# Patient Record
Sex: FEMALE | Employment: OTHER | URBAN - METROPOLITAN AREA
[De-identification: names, ages, dates, MRNs, and addresses within clinical notes are randomized per-mention and may not be internally consistent; named-entity substitution may affect disease eponyms.]

---

## 2019-01-30 LAB
CRP SERPL-MCNC: 1.3 MG/DL (ref 0–4.9)
EXT 24 HR UR METANEPHRINE: ABNORMAL
EXT 24 HR UR NORMETANEPHRINE: ABNORMAL
EXT 24 HR UR NORMETANEPHRINE: ABNORMAL
EXT 25 HYDROXY VIT D2: ABNORMAL
EXT 25 HYDROXY VIT D3: ABNORMAL
EXT 5 HIAA 24 HR URINE: ABNORMAL
EXT 5 HIAA BLOOD: ABNORMAL
EXT ACTH: ABNORMAL
EXT AFP: ABNORMAL
EXT ALBUMIN: 4 G/DL (ref 3.5–5.5)
EXT ALKALINE PHOSPHATASE: 116 IU/L (ref 39–117)
EXT ALT: 21 IU/L (ref 0–32)
EXT AMYLASE: ABNORMAL
EXT ANTI ISLET CELL AB: ABNORMAL
EXT ANTI PARIETAL CELL AB: ABNORMAL
EXT ANTI THYROID AB: ABNORMAL
EXT AST: 19 IU/L (ref 0–40)
EXT BILIRUBIN DIRECT: ABNORMAL
EXT BILIRUBIN TOTAL: 0.2 MG/DL (ref 0–1.2)
EXT BK VIRUS DNA QN PCR: ABNORMAL
EXT BUN: 12 MG/DL (ref 6–24)
EXT C PEPTIDE: ABNORMAL
EXT CA 125: ABNORMAL
EXT CA 19-9: ABNORMAL
EXT CA 27-29: ABNORMAL
EXT CALCITONIN: ABNORMAL
EXT CALCIUM: 8.6 MG/DL (ref 8.7–10.2)
EXT CEA: ABNORMAL
EXT CHLORIDE: 107 MMOL/L (ref 96–106)
EXT CHOLESTEROL: ABNORMAL
EXT CHROMOGRANIN A: ABNORMAL
EXT CO2: 21 MMOL/L (ref 20–29)
EXT CREATININE UA: ABNORMAL
EXT CREATININE: 0.77 MG/DL (ref 0.57–1)
EXT CYCLOSPORONE LEVEL: ABNORMAL
EXT DOPAMINE: ABNORMAL
EXT EBV DNA BY PCR: ABNORMAL
EXT EPINEPHRINE: ABNORMAL
EXT FOLATE: ABNORMAL
EXT FREE T3: ABNORMAL
EXT FREE T4: ABNORMAL
EXT FSH: ABNORMAL
EXT GASTRIN RELEASING PEPTIDE: ABNORMAL
EXT GASTRIN RELEASING PEPTIDE: ABNORMAL
EXT GASTRIN: 336 PG/ML (ref 0–115)
EXT GGT: ABNORMAL
EXT GHRELIN: ABNORMAL
EXT GLUCAGON: ABNORMAL
EXT GLUCOSE: 87 MG/DL (ref 65–99)
EXT GROWTH HORMONE: ABNORMAL
EXT HCV RNA QUANT PCR: ABNORMAL
EXT HDL: ABNORMAL
EXT HEMATOCRIT: 38.7 % (ref 34–46.6)
EXT HEMOGLOBIN A1C: ABNORMAL
EXT HEMOGLOBIN: 12.4 G/DL (ref 11.1–15.9)
EXT HISTAMINE 24 HR URINE: ABNORMAL
EXT HISTAMINE: ABNORMAL
EXT IGF-1: ABNORMAL
EXT IMMUNKNOW (NON-STIMULATED): ABNORMAL
EXT IMMUNKNOW (STIMULATED): ABNORMAL
EXT INR: ABNORMAL
EXT INSULIN: ABNORMAL
EXT LANREOTIDE LEVEL: ABNORMAL
EXT LDH, TOTAL: ABNORMAL
EXT LDL CHOLESTEROL: ABNORMAL
EXT LIPASE: ABNORMAL
EXT MAGNESIUM: ABNORMAL
EXT METANEPHRINE FREE PLASMA: ABNORMAL
EXT MOTILIN: ABNORMAL
EXT NEUROKININ A CAMB: ABNORMAL
EXT NEUROKININ A ISI: ABNORMAL
EXT NEUROTENSIN: ABNORMAL
EXT NOREPINEPHRINE: ABNORMAL
EXT NORMETANEPHRINE: ABNORMAL
EXT NSE: ABNORMAL
EXT OCTREOTIDE LEVEL: ABNORMAL
EXT PANCREASTATIN CAMB: ABNORMAL
EXT PANCREASTATIN ISI: ABNORMAL
EXT PANCREATIC POLYPEPTIDE: ABNORMAL
EXT PHOSPHORUS: ABNORMAL
EXT PLATELETS: 252 X10E3/UL (ref 150–379)
EXT POTASSIUM: 3.6 MMOL/L (ref 3.5–5.2)
EXT PROGRAF LEVEL: ABNORMAL
EXT PROLACTIN: ABNORMAL
EXT PROTEIN TOTAL: 6.4 G/DL (ref 6–8.5)
EXT PROTEIN UA: ABNORMAL
EXT PT: ABNORMAL
EXT PTH, INTACT: ABNORMAL
EXT PTT: ABNORMAL
EXT RAPAMUNE LEVEL: ABNORMAL
EXT SEROTONIN: 263 NG/ML (ref 0–420)
EXT SODIUM: 143 MMOL/L (ref 134–144)
EXT SOMATOSTATIN: ABNORMAL
EXT SUBSTANCE P: ABNORMAL
EXT TRIGLYCERIDES: ABNORMAL
EXT TRYPTASE: ABNORMAL
EXT TSH: ABNORMAL
EXT URIC ACID: ABNORMAL
EXT URINE AMYLASE U/HR: ABNORMAL
EXT URINE AMYLASE U/L: ABNORMAL
EXT VASOACTIVE INTESTINAL POLYPEPTIDE: ABNORMAL
EXT VITAMIN B12: ABNORMAL
EXT VMA 24 HR URINE: ABNORMAL
EXT WBC: 7.6 X10E3/UL (ref 3.4–10.8)
NEURON SPECIFIC ENOLASE: ABNORMAL
SEDIMENTATION RATE (WESTERGREN): 7 MM/HR (ref 0–32)

## 2019-12-17 LAB

## 2020-01-22 ENCOUNTER — TELEPHONE (OUTPATIENT)
Dept: NEUROLOGY | Facility: HOSPITAL | Age: 45
End: 2020-01-22

## 2020-01-22 DIAGNOSIS — R93.3 ABNORMAL CT SCAN, SMALL BOWEL: ICD-10-CM

## 2020-01-22 DIAGNOSIS — E16.4 ELEVATED GASTRIN LEVEL: Primary | ICD-10-CM

## 2020-01-22 RX ORDER — TOPIRAMATE 200 MG/1
TABLET ORAL 2 TIMES DAILY
COMMUNITY

## 2020-01-22 RX ORDER — AMITRIPTYLINE HYDROCHLORIDE 10 MG/1
10 TABLET, FILM COATED ORAL NIGHTLY PRN
COMMUNITY
End: 2020-02-12 | Stop reason: SDUPTHER

## 2020-01-22 RX ORDER — CHOLESTYRAMINE 4 G/9G
POWDER, FOR SUSPENSION ORAL
Status: ON HOLD | COMMUNITY
End: 2020-05-25

## 2020-01-22 RX ORDER — LACOSAMIDE 200 MG/1
TABLET ORAL EVERY 12 HOURS
COMMUNITY

## 2020-01-22 RX ORDER — HYDROCODONE BITARTRATE AND ACETAMINOPHEN 7.5; 325 MG/1; MG/1
1 TABLET ORAL EVERY 6 HOURS PRN
COMMUNITY
End: 2020-02-12 | Stop reason: SDUPTHER

## 2020-01-22 RX ORDER — DICYCLOMINE HYDROCHLORIDE 10 MG/1
10 CAPSULE ORAL
COMMUNITY
End: 2020-02-12 | Stop reason: SDUPTHER

## 2020-01-22 RX ORDER — PANTOPRAZOLE SODIUM 40 MG/1
40 TABLET, DELAYED RELEASE ORAL DAILY
COMMUNITY
End: 2020-02-12 | Stop reason: SDUPTHER

## 2020-01-22 RX ORDER — PROCHLORPERAZINE MALEATE 5 MG
5 TABLET ORAL EVERY 4 HOURS
Status: ON HOLD | COMMUNITY
End: 2020-06-05 | Stop reason: HOSPADM

## 2020-01-22 RX ORDER — PHENYTOIN SODIUM 100 MG/1
CAPSULE, EXTENDED RELEASE ORAL 3 TIMES DAILY
COMMUNITY
End: 2020-02-12 | Stop reason: SDUPTHER

## 2020-01-22 NOTE — TELEPHONE ENCOUNTER
New NET referral from Dr. Hancock.  Pt with elevated gastrin and abnormal oscan.Patient presented with recurrent abdominal pain with nausea and vomiting for more than a year.  Patient had numerous ER visits.  She has intermittent diarrhea and a 15# weight loss. She has been on questran and bentyl.

## 2020-01-22 NOTE — TELEPHONE ENCOUNTER
----- Message from Desean Guevara sent at 1/22/2020  1:08 PM CST -----  Pt is being referred to NeuroEndocrinology, I have scanned the referral/records into media mgr within Epic. Please review and contact pt for scheduling,thanks

## 2020-01-22 NOTE — TELEPHONE ENCOUNTER
Spoke with patient and scheduled her a consult with Dr. Barnett as well as a CT scan and markers. Patient stated she will bring her Oscan disc with her. Patient stated she has really felt fine but the doctor stated she needs to have this followed. Patient verbalized understanding.

## 2020-01-22 NOTE — TELEPHONE ENCOUNTER
----- Message from Vanessa Soriano RN sent at 1/22/2020  3:26 PM CST -----  pls call pt and sched with [facility] or [facility] for elevated gastrin and abd oscan.  Make sure he has a current ct (not in records).  If not, pls sched.  He will also need some labs.  Send pt lab order or have him drawn here.    Thanks    ----- Message -----  From: Desean Guevara  Sent: 1/22/2020   1:08 PM CST  To: [facility]    Pt is being referred to NeuroEndocrinology, I have scanned the referral/records into media mgr within Epic. Please review and contact pt for scheduling,thanks

## 2020-01-30 NOTE — PROGRESS NOTES
[facility]:  [place] [facility]  A collaboration between [facility] and [facility]      PATIENT: Kristan Goncalves  MRN: [id number]  DATE: 2/3/2020    Subjective:      Chief Complaint: Consult for elevated gastrin and abnormal octreoscan.Referred by Dr. Zafar Hancock.  Having lot of reflux, upper abdominal pain constant associated with nausea, vomiting and diarrhea  She has been having severe upper abdominal pain for the last 1 year, bile reflux with bile in the back of the throat in the early morning.    She was evaluated by her physician and now she is on Protonix and Carafate.  Since that time her abdominal pain has improved.  She reports about 15 lb weight loss because of all these problems    Vitals: Blood pressure 118/74, pulse 93, weight 104 kg (229 lb 4.5 oz). --    ECOG Score: 0 - Asymptomatic    Diagnosis: No diagnosis found.     Interval History: .Patient presented with recurrent abdominal pain with nausea and vomiting for more than a year.  Patient had numerous ER visits.  She has intermittent diarrhea and a 15# weight loss. She has been on questran and bentyl. No pathology yet.    Oncologic History:   Oncologic History    Oncologic Treatment    Pathology      Past Medical History:  Past Medical History:   Diagnosis Date    Elevated gastrin level     Seizures     Sleep apnea        Past Surgical History:  Past Surgical History:   Procedure Laterality Date    CHOLECYSTOTOMY      HYSTERECTOMY         Family History:  No family history on file.    Allergies:  Patient has no known allergies.    Medications:   Current Outpatient Medications   Medication Sig    amitriptyline (ELAVIL) 10 MG tablet Take 10 mg by mouth nightly as needed for Insomnia.    cholestyramine, with sugar, 4 gram Powd Take by mouth.    dicyclomine (BENTYL) 10 MG capsule Take 10 mg by mouth 4 (four) times daily before meals and nightly.     HYDROcodone-acetaminophen (NORCO) 7.5-325 mg per tablet Take 1 tablet by mouth every 6 (six) hours as needed for Pain.    lacosamide (VIMPAT) 200 mg Tab tablet Take by mouth every 12 (twelve) hours.    omeprazole (PRILOSEC) 40 MG capsule TAKE 1 CAPSULE TWICE DAILY FOR SIX WEEKS THEN TAKE 1 CAPSULE DAILY    pantoprazole (PROTONIX) 40 MG tablet Take 40 mg by mouth once daily.    phenytoin (DILANTIN) 100 MG ER capsule Take by mouth 3 (three) times daily.    prochlorperazine (COMPAZINE) 5 MG tablet Take 5 mg by mouth every 4 (four) hours.    sucralfate (CARAFATE) 1 gram tablet Take 1 g by mouth.    topiramate (TOPAMAX) 200 MG Tab Take by mouth 2 (two) times daily.     No current facility-administered medications for this visit.         Review of Systems   Constitutional: Positive for activity change, appetite change, fatigue and unexpected weight change. Negative for chills, diaphoresis and fever.   HENT: Negative for congestion, dental problem, drooling, ear discharge, hearing loss, mouth sores, nosebleeds, postnasal drip, sinus pressure, sneezing, sore throat, tinnitus and trouble swallowing.    Eyes: Negative for photophobia, discharge, redness, itching and visual disturbance.   Respiratory: Negative for apnea, cough, choking, chest tightness, shortness of breath, wheezing and stridor.    Cardiovascular: Negative for chest pain, palpitations and leg swelling.   Gastrointestinal: Positive for abdominal distention, abdominal pain, diarrhea, nausea and vomiting. Negative for anal bleeding and constipation.   Endocrine: Negative.    Genitourinary: Negative.    Musculoskeletal: Negative for arthralgias, back pain, gait problem and neck stiffness.   Skin: Negative for color change, pallor and rash.   Allergic/Immunologic: Negative.    Neurological: Positive for seizures. Negative for dizziness, tremors, speech difficulty, weakness, light-headedness, numbness and headaches.   Hematological: Negative for adenopathy.  Does not bruise/bleed easily.   Psychiatric/Behavioral: Negative for agitation, behavioral problems, confusion and decreased concentration.      Objective:      Physical Exam   Constitutional: She appears well-developed and well-nourished.   HENT:   Head: Normocephalic and atraumatic.   Right Ear: External ear normal.   Left Ear: External ear normal.   Mouth/Throat: Oropharynx is clear and moist.   Eyes: Pupils are equal, round, and reactive to light. Conjunctivae are normal.   Neck: Normal range of motion.   Cardiovascular: Normal rate and regular rhythm.   Pulmonary/Chest: Effort normal and breath sounds normal.   Abdominal: Soft. Bowel sounds are normal. She exhibits no distension and no mass. There is no tenderness. There is no rebound and no guarding. No hernia.   Musculoskeletal: Normal range of motion.   Neurological: She is alert.   Skin: Skin is warm and dry.   Psychiatric: She has a normal mood and affect. Her behavior is normal.      Assessment:       No diagnosis found.    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 She may have a type 1 gastric carcinoids.    She has been recently placed on Protonix and Carafate after which she feels she pain is slightly better.  The octreotide scan was reviewed if it has a normal activity at the head of the pancreas.    Will obtain gallium scan to evaluate for and delineate the findings better.  I discussed with him about the need to continue medical management with Protonix Carafate and if she is not getting better she may require an antrectomy and gastrojejunostomy.    I then briefly talked about the surgery surgical risks and the this the long-term effects after the surgery such as bleeding infection DVT PE weight loss after the  surgery.    Plan:     gallium scan to be done to evaluate the knee extra gastric activity  Continue medical management with Protonix and Carafate if not getting better may require partial gastrectomy.  Will plan to get perform it minimally invasive way.    I will see her after the gallium scan.            MAEGAN Barnett MD, FACS   Associate Professor of Surgery, [facility]   Neuroendocrine Surgery, Hepatic/Pancreatic & General Surgery   [address], [address]   [place] [address]   ph. [phone]; [phone]   fax. [phone]

## 2020-02-03 ENCOUNTER — OFFICE VISIT (OUTPATIENT)
Dept: NEUROLOGY | Facility: HOSPITAL | Age: 45
End: 2020-02-03
Attending: SURGERY
Payer: MEDICARE

## 2020-02-03 ENCOUNTER — HOSPITAL ENCOUNTER (OUTPATIENT)
Dept: RADIOLOGY | Facility: HOSPITAL | Age: 45
Discharge: HOME | End: 2020-02-03
Attending: SURGERY
Payer: MEDICARE

## 2020-02-03 VITALS — HEART RATE: 93 BPM | SYSTOLIC BLOOD PRESSURE: 118 MMHG | WEIGHT: 229.25 LBS | DIASTOLIC BLOOD PRESSURE: 74 MMHG

## 2020-02-03 DIAGNOSIS — E16.4 ELEVATED GASTRIN LEVEL: ICD-10-CM

## 2020-02-03 DIAGNOSIS — C7A.098 MALIGNANT CARCINOID TUMORS OF OTHER SITES: ICD-10-CM

## 2020-02-03 DIAGNOSIS — K29.00 OTHER ACUTE GASTRITIS WITHOUT HEMORRHAGE: ICD-10-CM

## 2020-02-03 DIAGNOSIS — E16.4 HYPERGASTRINEMIA: Primary | ICD-10-CM

## 2020-02-03 DIAGNOSIS — R93.3 ABNORMAL CT SCAN, SMALL BOWEL: ICD-10-CM

## 2020-02-03 PROCEDURE — 74178 CT ABD&PLV WO CNTR FLWD CNTR: CPT | Mod: 26,,, | Performed by: RADIOLOGY

## 2020-02-03 PROCEDURE — 74178 CT ABDOMEN PELVIS W WO CONTRAST: ICD-10-PCS | Mod: 26,,, | Performed by: RADIOLOGY

## 2020-02-03 PROCEDURE — 99213 OFFICE O/P EST LOW 20 MIN: CPT | Mod: 25 | Performed by: SURGERY

## 2020-02-03 PROCEDURE — 74178 CT ABD&PLV WO CNTR FLWD CNTR: CPT | Mod: TC

## 2020-02-03 PROCEDURE — 25500020 PHARM REV CODE 255: Performed by: SURGERY

## 2020-02-03 RX ORDER — SUCRALFATE 1 G/1
1 TABLET ORAL
COMMUNITY
Start: 2019-02-03 | End: 2020-02-03

## 2020-02-03 RX ORDER — OMEPRAZOLE 40 MG/1
CAPSULE, DELAYED RELEASE ORAL
COMMUNITY
Start: 2019-12-18 | End: 2020-02-12 | Stop reason: ALTCHOICE

## 2020-02-03 RX ADMIN — IOHEXOL 1000 ML: 12 SOLUTION ORAL at 08:02

## 2020-02-03 RX ADMIN — IOHEXOL 100 ML: 350 INJECTION, SOLUTION INTRAVENOUS at 10:02

## 2020-02-12 ENCOUNTER — HOSPITAL ENCOUNTER (EMERGENCY)
Facility: HOSPITAL | Age: 45
Discharge: HOME | End: 2020-02-12
Attending: EMERGENCY MEDICINE
Payer: MEDICARE

## 2020-02-12 ENCOUNTER — TELEPHONE (OUTPATIENT)
Dept: NEUROLOGY | Facility: HOSPITAL | Age: 45
End: 2020-02-12

## 2020-02-12 VITALS
SYSTOLIC BLOOD PRESSURE: 124 MMHG | OXYGEN SATURATION: 100 % | TEMPERATURE: 98 F | RESPIRATION RATE: 20 BRPM | HEART RATE: 94 BPM | DIASTOLIC BLOOD PRESSURE: 75 MMHG | WEIGHT: 220 LBS | HEIGHT: 65 IN | BODY MASS INDEX: 36.65 KG/M2

## 2020-02-12 DIAGNOSIS — R10.13 EPIGASTRIC ABDOMINAL PAIN: ICD-10-CM

## 2020-02-12 DIAGNOSIS — E86.0 DEHYDRATION: Primary | ICD-10-CM

## 2020-02-12 DIAGNOSIS — R11.2 NON-INTRACTABLE VOMITING WITH NAUSEA, UNSPECIFIED VOMITING TYPE: ICD-10-CM

## 2020-02-12 LAB
ALBUMIN SERPL BCP-MCNC: 5 G/DL (ref 3.5–5.2)
ALP SERPL-CCNC: 125 U/L (ref 55–135)
ALT SERPL W/O P-5'-P-CCNC: 18 U/L (ref 10–44)
ANION GAP SERPL CALC-SCNC: 15 MMOL/L (ref 8–16)
AST SERPL-CCNC: 19 U/L (ref 10–40)
BASOPHILS # BLD AUTO: 0.04 K/UL (ref 0–0.2)
BASOPHILS NFR BLD: 0.3 % (ref 0–1.9)
BILIRUB SERPL-MCNC: 0.5 MG/DL (ref 0.1–1)
BILIRUB UR QL STRIP: NEGATIVE
BUN SERPL-MCNC: 20 MG/DL (ref 6–20)
CALCIUM SERPL-MCNC: 10.4 MG/DL (ref 8.7–10.5)
CHLORIDE SERPL-SCNC: 105 MMOL/L (ref 95–110)
CLARITY UR: ABNORMAL
CO2 SERPL-SCNC: 19 MMOL/L (ref 23–29)
COLOR UR: ABNORMAL
CREAT SERPL-MCNC: 1.1 MG/DL (ref 0.5–1.4)
DIFFERENTIAL METHOD: ABNORMAL
EOSINOPHIL # BLD AUTO: 0 K/UL (ref 0–0.5)
EOSINOPHIL NFR BLD: 0.2 % (ref 0–8)
ERYTHROCYTE [DISTWIDTH] IN BLOOD BY AUTOMATED COUNT: 13.1 % (ref 11.5–14.5)
EST. GFR  (AFRICAN AMERICAN): >60 ML/MIN/1.73 M^2
EST. GFR  (NON AFRICAN AMERICAN): >60 ML/MIN/1.73 M^2
GLUCOSE SERPL-MCNC: 125 MG/DL (ref 70–110)
GLUCOSE UR QL STRIP: NEGATIVE
HCT VFR BLD AUTO: 50.1 % (ref 37–48.5)
HGB BLD-MCNC: 16 G/DL (ref 12–16)
HGB UR QL STRIP: ABNORMAL
IMM GRANULOCYTES # BLD AUTO: 0.06 K/UL (ref 0–0.04)
IMM GRANULOCYTES NFR BLD AUTO: 0.4 % (ref 0–0.5)
KETONES UR QL STRIP: NEGATIVE
LACTATE SERPL-SCNC: 1.4 MMOL/L (ref 0.5–2.2)
LEUKOCYTE ESTERASE UR QL STRIP: NEGATIVE
LIPASE SERPL-CCNC: 105 U/L (ref 4–60)
LYMPHOCYTES # BLD AUTO: 1.8 K/UL (ref 1–4.8)
LYMPHOCYTES NFR BLD: 11.7 % (ref 18–48)
MCH RBC QN AUTO: 26.9 PG (ref 27–31)
MCHC RBC AUTO-ENTMCNC: 31.9 G/DL (ref 32–36)
MCV RBC AUTO: 84 FL (ref 82–98)
MONOCYTES # BLD AUTO: 0.6 K/UL (ref 0.3–1)
MONOCYTES NFR BLD: 3.8 % (ref 4–15)
NEUTROPHILS # BLD AUTO: 12.8 K/UL (ref 1.8–7.7)
NEUTROPHILS NFR BLD: 83.6 % (ref 38–73)
NITRITE UR QL STRIP: NEGATIVE
NRBC BLD-RTO: 0 /100 WBC
PH UR STRIP: 6 [PH] (ref 5–8)
PLATELET # BLD AUTO: 338 K/UL (ref 150–350)
PMV BLD AUTO: 10.7 FL (ref 9.2–12.9)
POTASSIUM SERPL-SCNC: 3.4 MMOL/L (ref 3.5–5.1)
PROT SERPL-MCNC: 9.4 G/DL (ref 6–8.4)
PROT UR QL STRIP: ABNORMAL
RBC # BLD AUTO: 5.94 M/UL (ref 4–5.4)
SODIUM SERPL-SCNC: 139 MMOL/L (ref 136–145)
SP GR UR STRIP: >=1.03 (ref 1–1.03)
URN SPEC COLLECT METH UR: ABNORMAL
UROBILINOGEN UR STRIP-ACNC: NEGATIVE EU/DL
WBC # BLD AUTO: 15.33 K/UL (ref 3.9–12.7)

## 2020-02-12 PROCEDURE — 96375 TX/PRO/DX INJ NEW DRUG ADDON: CPT

## 2020-02-12 PROCEDURE — C9113 INJ PANTOPRAZOLE SODIUM, VIA: HCPCS | Performed by: PHYSICIAN ASSISTANT

## 2020-02-12 PROCEDURE — 93010 ELECTROCARDIOGRAM REPORT: CPT | Mod: ,,, | Performed by: STUDENT IN AN ORGANIZED HEALTH CARE EDUCATION/TRAINING PROGRAM

## 2020-02-12 PROCEDURE — 83605 ASSAY OF LACTIC ACID: CPT

## 2020-02-12 PROCEDURE — 93010 EKG 12-LEAD: ICD-10-PCS | Mod: ,,, | Performed by: STUDENT IN AN ORGANIZED HEALTH CARE EDUCATION/TRAINING PROGRAM

## 2020-02-12 PROCEDURE — 99284 EMERGENCY DEPT VISIT MOD MDM: CPT | Mod: 25

## 2020-02-12 PROCEDURE — 93005 ELECTROCARDIOGRAM TRACING: CPT

## 2020-02-12 PROCEDURE — 63600175 PHARM REV CODE 636 W HCPCS: Performed by: PHYSICIAN ASSISTANT

## 2020-02-12 PROCEDURE — 96374 THER/PROPH/DIAG INJ IV PUSH: CPT

## 2020-02-12 PROCEDURE — 85025 COMPLETE CBC W/AUTO DIFF WBC: CPT

## 2020-02-12 PROCEDURE — 83690 ASSAY OF LIPASE: CPT

## 2020-02-12 PROCEDURE — 80053 COMPREHEN METABOLIC PANEL: CPT

## 2020-02-12 PROCEDURE — 96361 HYDRATE IV INFUSION ADD-ON: CPT

## 2020-02-12 PROCEDURE — 81003 URINALYSIS AUTO W/O SCOPE: CPT

## 2020-02-12 RX ORDER — METOCLOPRAMIDE HYDROCHLORIDE 5 MG/ML
5 INJECTION INTRAMUSCULAR; INTRAVENOUS
Status: COMPLETED | OUTPATIENT
Start: 2020-02-12 | End: 2020-02-12

## 2020-02-12 RX ORDER — AMITRIPTYLINE HYDROCHLORIDE 10 MG/1
10 TABLET, FILM COATED ORAL NIGHTLY PRN
Qty: 10 TABLET | Refills: 0 | Status: SHIPPED | OUTPATIENT
Start: 2020-02-12

## 2020-02-12 RX ORDER — DICYCLOMINE HYDROCHLORIDE 10 MG/1
10 CAPSULE ORAL
Qty: 20 CAPSULE | Refills: 0 | Status: ON HOLD | OUTPATIENT
Start: 2020-02-12 | End: 2020-05-25

## 2020-02-12 RX ORDER — PANTOPRAZOLE SODIUM 40 MG/10ML
40 INJECTION, POWDER, LYOPHILIZED, FOR SOLUTION INTRAVENOUS
Status: COMPLETED | OUTPATIENT
Start: 2020-02-12 | End: 2020-02-12

## 2020-02-12 RX ORDER — PANTOPRAZOLE SODIUM 40 MG/1
40 TABLET, DELAYED RELEASE ORAL DAILY
Qty: 30 TABLET | Refills: 0 | Status: SHIPPED | OUTPATIENT
Start: 2020-02-12

## 2020-02-12 RX ORDER — ONDANSETRON 4 MG/1
4 TABLET, ORALLY DISINTEGRATING ORAL EVERY 8 HOURS PRN
Qty: 30 TABLET | Refills: 0 | Status: SHIPPED | OUTPATIENT
Start: 2020-02-12

## 2020-02-12 RX ORDER — MORPHINE SULFATE 4 MG/ML
4 INJECTION, SOLUTION INTRAMUSCULAR; INTRAVENOUS
Status: COMPLETED | OUTPATIENT
Start: 2020-02-12 | End: 2020-02-12

## 2020-02-12 RX ORDER — DIPHENHYDRAMINE HYDROCHLORIDE 50 MG/ML
25 INJECTION INTRAMUSCULAR; INTRAVENOUS
Status: COMPLETED | OUTPATIENT
Start: 2020-02-12 | End: 2020-02-12

## 2020-02-12 RX ORDER — PHENYTOIN SODIUM 100 MG/1
100 CAPSULE, EXTENDED RELEASE ORAL 3 TIMES DAILY
Qty: 30 CAPSULE | Refills: 0 | Status: SHIPPED | OUTPATIENT
Start: 2020-02-12

## 2020-02-12 RX ORDER — HYDROCODONE BITARTRATE AND ACETAMINOPHEN 7.5; 325 MG/1; MG/1
1 TABLET ORAL EVERY 6 HOURS PRN
Qty: 5 TABLET | Refills: 0 | Status: ON HOLD | OUTPATIENT
Start: 2020-02-12 | End: 2020-06-05 | Stop reason: HOSPADM

## 2020-02-12 RX ORDER — PROMETHAZINE HYDROCHLORIDE 25 MG/1
25 SUPPOSITORY RECTAL EVERY 6 HOURS PRN
Qty: 10 SUPPOSITORY | Refills: 0 | Status: ON HOLD | OUTPATIENT
Start: 2020-02-12 | End: 2020-06-05 | Stop reason: HOSPADM

## 2020-02-12 RX ADMIN — PANTOPRAZOLE SODIUM 40 MG: 40 INJECTION, POWDER, LYOPHILIZED, FOR SOLUTION INTRAVENOUS at 01:02

## 2020-02-12 RX ADMIN — SODIUM CHLORIDE 1000 ML: 0.9 INJECTION, SOLUTION INTRAVENOUS at 12:02

## 2020-02-12 RX ADMIN — DIPHENHYDRAMINE HYDROCHLORIDE 25 MG: 50 INJECTION, SOLUTION INTRAMUSCULAR; INTRAVENOUS at 12:02

## 2020-02-12 RX ADMIN — SODIUM CHLORIDE 1000 ML: 0.9 INJECTION, SOLUTION INTRAVENOUS at 03:02

## 2020-02-12 RX ADMIN — MORPHINE SULFATE 4 MG: 4 INJECTION INTRAVENOUS at 02:02

## 2020-02-12 RX ADMIN — METOCLOPRAMIDE 5 MG: 5 INJECTION, SOLUTION INTRAMUSCULAR; INTRAVENOUS at 12:02

## 2020-02-12 NOTE — ED NOTES
To room 11 per wheelchair from lobby bathroom. Pt. Is actively vomiting mostly clear liquid emesis.

## 2020-02-12 NOTE — ED PROVIDER NOTES
Encounter Date: 2/12/2020       History     Chief Complaint   Patient presents with    Emesis     pt was released from hospital yesterday, pt of dr Turcios, started with n/v/d this am at 0600       Afebrile 44-year-old female with PMH seizures, sleep apnea, elevated gastrin level,  Chronic upper abdominal pain, nausea, vomiting, diarrhea with possible neuroendocrine abnormality followed by neuroendocrine specialist presents to the ED with continued nausea and vomiting and upper abdominal pain.  Patient and family state that this is consistent of her chronic process.  They do state she is unable to keep anything down despite trying Zofran at home prior to drive here as she is from [place].  She is due to have a special gastric scan tomorrow to better evaluate.  Patient does report some generalized weakness and fatigue secondary to vomiting and diarrhea.  Patient states that she has noted some blood tinge hematemesis.  Once again patient states that she is followed by a neuroendocrine specialist and GI specialist with recent scope in the past.    The history is provided by the patient.     Review of patient's allergies indicates:  No Known Allergies  Past Medical History:   Diagnosis Date    Elevated gastrin level     Seizures     Sleep apnea      Past Surgical History:   Procedure Laterality Date    CHOLECYSTOTOMY      HYSTERECTOMY       No family history on file.  Social History     Tobacco Use    Smoking status: Former Smoker   Substance Use Topics    Alcohol use: Not on file    Drug use: Not on file     Review of Systems   Constitutional: Positive for fatigue. Negative for chills and fever.   HENT: Negative for congestion and sore throat.    Respiratory: Negative for cough and shortness of breath.    Cardiovascular: Negative for chest pain.   Gastrointestinal: Positive for abdominal pain, diarrhea, nausea and vomiting.   Genitourinary: Negative for dysuria, flank pain, vaginal bleeding and vaginal  discharge.   Musculoskeletal: Negative for arthralgias, back pain and myalgias.   Skin: Negative for rash.   Allergic/Immunologic: Negative for immunocompromised state.   Neurological: Positive for weakness and light-headedness. Negative for dizziness and headaches.   Hematological: Does not bruise/bleed easily.       Physical Exam     Initial Vitals [02/12/20 1156]   BP Pulse Resp Temp SpO2   134/84 (!) 114 19 98 °F (36.7 °C) 99 %      MAP       --         Vitals:    02/12/20 1750   BP: 124/75   Pulse: 94   Resp: 20   Temp: 97.9 °F (36.6 °C)       Physical Exam    Nursing note and vitals reviewed.  Constitutional: Vital signs are normal. She appears well-developed and well-nourished. She is cooperative.  Non-toxic appearance. She does not appear ill. She appears distressed.   HENT:   Head: Normocephalic and atraumatic.   Mouth/Throat: Mucous membranes are not pale and dry. No posterior oropharyngeal edema or posterior oropharyngeal erythema.   Eyes: Conjunctivae and lids are normal.   Neck: Neck supple. No neck rigidity.   Cardiovascular: Normal rate and regular rhythm.   Pulmonary/Chest: Breath sounds normal. No respiratory distress. She has no wheezes. She has no rhonchi.   Abdominal: Soft. Normal appearance and bowel sounds are normal. There is generalized tenderness. There is no rigidity, no rebound, no guarding and no CVA tenderness.   Musculoskeletal: Normal range of motion.   Neurological: She is alert and oriented to person, place, and time. GCS eye subscore is 4. GCS verbal subscore is 5. GCS motor subscore is 6.   Skin: Skin is warm, dry and intact. No rash noted.   Psychiatric: She has a normal mood and affect. Her speech is normal and behavior is normal. Thought content normal.         ED Course   Procedures  Labs Reviewed   CBC W/ AUTO DIFFERENTIAL - Abnormal; Notable for the following components:       Result Value    WBC 15.33 (*)     RBC 5.94 (*)     Hematocrit 50.1 (*)     Mean Corpuscular  Hemoglobin 26.9 (*)     Mean Corpuscular Hemoglobin Conc 31.9 (*)     Gran # (ANC) 12.8 (*)     Immature Grans (Abs) 0.06 (*)     Gran% 83.6 (*)     Lymph% 11.7 (*)     Mono% 3.8 (*)     All other components within normal limits   COMPREHENSIVE METABOLIC PANEL - Abnormal; Notable for the following components:    Potassium 3.4 (*)     CO2 19 (*)     Glucose 125 (*)     Total Protein 9.4 (*)     All other components within normal limits   LIPASE - Abnormal; Notable for the following components:    Lipase 105 (*)     All other components within normal limits   LACTIC ACID, PLASMA   LACTIC ACID, PLASMA   URINALYSIS, REFLEX TO URINE CULTURE        ECG Results          EKG 12-lead (Final result)  Result time 02/12/20 15:16:20    Final result by Interface, Lab In [facility] (02/12/20 15:16:20)                 Narrative:    Test Reason : R10.13,    Vent. Rate : 134 BPM     Atrial Rate : 134 BPM     P-R Int : 140 ms          QRS Dur : 072 ms      QT Int : 302 ms       P-R-T Axes : 075 -63 064 degrees     QTc Int : 450 ms    Sinus tachycardia  Right atrial enlargement  Left axis deviation  Abnormal ECG  No previous ECGs available  Confirmed by Remigio Calderon MD ([id number]) on 2/12/2020 3:15:53 PM    Referred By: SHAWNA   SELF           Confirmed By:Remigio Calderon MD                            Imaging Results    None          Medical Decision Making:   Initial Assessment:    44-year-old female with history of chronic abdominal pain recurrent nausea and vomiting and diarrhea presents the ED for exacerbation of the symptoms.  Patient recently began care with neuroendocrine specialist as concern for this being the etiology of her symptoms.  She is due to have gastric scan tomorrow.  She appears in some distress secondary symptoms and has noted dry mucous membranes.  Heart with tachycardia; normal rhythm.  Lungs CTA.  Abdomen with generalized tenderness however on re-evaluation this has resolved.  No guarding or rigidity or rebound no  CVA tenderness.  Fair range of motion of all extremities with strength equal bilaterally.   Skin free of rash pallor or diaphoresis.  Differential Diagnosis:   Differential Diagnosis includes, but is not limited to:  AAA, aortic dissection, mesenteric ischemia, perforated viscous, MI/ACS, SBO/volvulus, incarcerated/strangulated hernia, intussusception, ileus, appendicitis, cholecystitis, cholangitis, diverticulitis, esophagitis, hepatitis, nephrolithiasis, pancreatitis, gastroenteritis, colitis, IBD/IBS, biliary colic, GERD, PUD, constipation, UTI/pyelonephritis,  disorder.    Clinical Tests:   Lab Tests: Ordered and Reviewed  Radiological Study: Ordered and Reviewed  Medical Tests: Ordered and Reviewed  ED Management:    EKG reveals sinus tachycardia this did improve after oral hydration.  Labs consistent with dehydration as she appears hemoconcentrated with a mildly elevated leukocytosis and mild hypokalemia.  Lactic within normal limits.  Patient continues to be afebrile with low suspicion of acute surgical abdomen at this time.  Lipase is noted to be elevated however this appears chronically some of this patient.    The suspicion of this acute surgical abdomen at this time given the chronicity of patient's symptoms and no significant abnormalities on her workup today with her having had multiple CT scans and scopes in the past. Did discuss findings with Dr. Ericka adam and patient was offered observation for further IV hydration however would like to be discharged so she can have her gastric scan tomorrow.  She will be sent home with antiemetics and was instructed on proper return should symptoms worsen or new symptoms begin.   Patient did have successful p.o. Challenge and moderate improvement symptoms in the ED after antiemetic and pain medication.  His patient intended to go back to [place] today she does not have her medications and she will be sent home with a short supply of these including pain  medication. Strict instructions to follow up with primary care physician or reference provided for further assessment and evaluation. Given instructions to return for any acute symptoms and verbalized understanding of this medical plan.    Other:   I have discussed this case with another health care provider.       <> Summary of the Discussion: 4:24 PM. Spoke with Dr. Barnett (General Surgery). He recommends a follow-up appointment tomorrow for scan. He may be discharged on clear liquids and antiemetics.              Attending Attestation:     Physician Attestation Statement for NP/PA:   I discussed this assessment and plan of this patient with the NP/PA, but I did not personally examine the patient. The face to face encounter was performed by the NP/PA.                                Clinical Impression:       ICD-10-CM ICD-9-CM   1. Dehydration E86.0 276.51   2. Epigastric abdominal pain R10.13 789.06   3. Non-intractable vomiting with nausea, unspecified vomiting type R11.2 787.01                             ANDRES Patton  02/13/20 1319       Arnaldo Carr MD  02/14/20 [id number]

## 2020-02-12 NOTE — ED NOTES
Assisted pt. To bathroom per wheelchair and clean catch urine sample collected and sent to lab. Pt. Still has moderate weakness, n/v has improved. Pt. Given ice chips and awaiting update on disposition. Family at bedside.

## 2020-02-12 NOTE — ED NOTES
ANDRES Nieves at bedside for reassessment and to discuss test results and plan of care with pt. And family.

## 2020-02-12 NOTE — TELEPHONE ENCOUNTER
Spoke with pt and she was not feeling good at all. She is coming to the ER here. Told her that the ER doctors would call Dr. Lepe. Pt verbalized understanding

## 2020-02-12 NOTE — ED NOTES
Pt is alert and oriented. Pt appears to be in distress, dry heaving and c/o abdominal pain, unable to sit up straight.  Pt daughter states pt has hx of gastric ulcers and is due to have a scan tomorrow. She was seen in the ER in [facility] on Monday this week and was sent home with zofran and carafate. Pt states she had some relief to her symptoms but they returned today. Pt c/o abdominal pain 10/10 to the upper left and right quadrants with and without palpation. Pt denies chest pain, sob, diarrhea, chills, fever.

## 2020-02-13 ENCOUNTER — HOSPITAL ENCOUNTER (OUTPATIENT)
Dept: RADIOLOGY | Facility: HOSPITAL | Age: 45
Discharge: HOME | End: 2020-02-13
Attending: SURGERY
Payer: MEDICARE

## 2020-02-13 DIAGNOSIS — C7A.098 MALIGNANT CARCINOID TUMORS OF OTHER SITES: ICD-10-CM

## 2020-02-13 DIAGNOSIS — E16.4 HYPERGASTRINEMIA: ICD-10-CM

## 2020-02-13 DIAGNOSIS — K29.00 OTHER ACUTE GASTRITIS WITHOUT HEMORRHAGE: ICD-10-CM

## 2020-02-13 PROCEDURE — A9587 GALLIUM GA-68: HCPCS

## 2020-02-13 PROCEDURE — 78815 NM PET 68GA DOTATATE WHOLE BODY: ICD-10-PCS | Mod: 26,PS,, | Performed by: RADIOLOGY

## 2020-02-13 PROCEDURE — 78815 PET IMAGE W/CT SKULL-THIGH: CPT | Mod: 26,PS,, | Performed by: RADIOLOGY

## 2020-02-13 PROCEDURE — 78815 PET IMAGE W/CT SKULL-THIGH: CPT | Mod: TC

## 2020-02-14 NOTE — PROGRESS NOTES
"N[place]AN[place]:  [facility]  A collaboration between [facility] and [facility]      PATIENT: Kristan Goncalves  MRN: [id number]  DATE: 2/17/2020    Subjective:      Chief Complaint: No chief complaint on file.   She has been referred here for persistent nausea vomiting and was found to have a high gastrin level. She underwent EGD which shows diffuse inflammation of the whole stomach. She was recently in the ER for severe pain nausea vomiting.  She was scheduled for gallium scan gallium scan around the time she was rehydrated.  She feels better today however she has frequent intermittent episodes of this nausea vomiting.    Vitals: Blood pressure 122/77, pulse 73, height 5' 5" (1.651 m), weight 102.5 kg (225 lb 15.5 oz). -  ECOG Score: 1 - Symptomatic but completely ambulatory    Diagnosis: No diagnosis found.     Interval History:     Oncologic History:   Oncologic History    Oncologic Treatment    Pathology      Past Medical History:  Past Medical History:   Diagnosis Date    Elevated gastrin level     Seizures     Sleep apnea        Past Surgical History:  Past Surgical History:   Procedure Laterality Date    CHOLECYSTOTOMY      HYSTERECTOMY         Family History:  History reviewed. No pertinent family history.    Allergies:  Patient has no known allergies.    Medications:   Current Outpatient Medications   Medication Sig    amitriptyline (ELAVIL) 10 MG tablet Take 1 tablet (10 mg total) by mouth nightly as needed for Insomnia.    HYDROcodone-acetaminophen (NORCO) 7.5-325 mg per tablet Take 1 tablet by mouth every 6 (six) hours as needed for Pain.    lacosamide (VIMPAT) 200 mg Tab tablet Take by mouth every 12 (twelve) hours.    ondansetron (ZOFRAN-ODT) 4 MG TbDL Take 1 tablet (4 mg total) by mouth every 8 (eight) hours as needed.    pantoprazole (PROTONIX) 40 MG tablet Take 1 tablet (40 mg total) by mouth once daily. "    phenytoin (DILANTIN) 100 MG ER capsule Take 1 capsule (100 mg total) by mouth 3 (three) times daily.    promethazine (PHENERGAN) 25 MG suppository Place 1 suppository (25 mg total) rectally every 6 (six) hours as needed for Nausea.    topiramate (TOPAMAX) 200 MG Tab Take by mouth 2 (two) times daily.    cholestyramine, with sugar, 4 gram Powd Take by mouth.    dicyclomine (BENTYL) 10 MG capsule Take 1 capsule (10 mg total) by mouth 4 (four) times daily before meals and nightly. (Patient not taking: Reported on 2/17/2020)    prochlorperazine (COMPAZINE) 5 MG tablet Take 5 mg by mouth every 4 (four) hours.     No current facility-administered medications for this visit.         Review of Systems   Constitutional: Positive for activity change, appetite change and fatigue. Negative for chills, diaphoresis, fever and unexpected weight change.   HENT: Negative for congestion, dental problem, ear discharge, mouth sores, nosebleeds, postnasal drip, rhinorrhea, sinus pressure, sinus pain, sneezing, sore throat, tinnitus and trouble swallowing.    Eyes: Negative for photophobia, pain, discharge, redness, itching and visual disturbance.   Respiratory: Negative for apnea, cough, choking, chest tightness, shortness of breath and stridor.    Cardiovascular: Negative for chest pain, palpitations and leg swelling.   Gastrointestinal: Positive for abdominal distention, nausea and vomiting. Negative for constipation, diarrhea and rectal pain.   Endocrine: Negative.    Genitourinary: Negative.    Musculoskeletal: Negative.    Skin: Negative.    Allergic/Immunologic: Negative.    Neurological: Negative for dizziness, tremors, seizures, syncope, facial asymmetry, speech difficulty, weakness, numbness and headaches.   Hematological: Negative for adenopathy. Does not bruise/bleed easily.   Psychiatric/Behavioral: Negative for agitation and dysphoric mood.      Objective:      Physical Exam   Constitutional: She is oriented to  person, place, and time. She appears well-developed and well-nourished.   HENT:   Head: Normocephalic and atraumatic.   Right Ear: External ear normal.   Left Ear: External ear normal.   Eyes: Pupils are equal, round, and reactive to light. Conjunctivae are normal.   Neck: Normal range of motion.   Cardiovascular: Normal rate and regular rhythm.   Pulmonary/Chest: Effort normal and breath sounds normal.   Abdominal: Soft. Bowel sounds are normal. She exhibits no distension and no mass. There is no tenderness. There is no rebound and no guarding. No hernia.   Musculoskeletal: Normal range of motion.   Neurological: She is alert and oriented to person, place, and time.   Skin: Skin is warm and dry.   Psychiatric: She has a normal mood and affect. Her behavior is normal.      Assessment:       No diagnosis found.    Laboratory Data:       Scans:   NM PET Ga68 Dotatate  Narrative: EXAMINATION:  NM PET 68GA DOTATATE WHOLE BODY    CLINICAL HISTORY:  Malignant carcinoid tumors of other sites    TECHNIQUE:  3.8 mCi of GA68 Dotatate was administered intravenously in the right hand. After an approximately 60 min distribution time, PET/CT images were acquired from the skull vertex to the mid thigh. Transmission images were acquired to correct for attenuation using a whole body low-dose CT scan without contrast with the arms positioned above the head.    COMPARISON:  CT abdomen and pelvis 02/03/2020    FINDINGS:  Quality of the study: Adequate.    There are 2 foci of tracer avidity at the level of the 3rd portion of the duodenum or possibly the uncinate process of the pancreas.  These foci potentially represent tumor deposits.  Correlation with endoscopy and EUS is recommended.  No definite other gallium avid foci elsewhere.    Enlarged left thyroid lobe with a large hypodense nodule seen inferiorly measuring up to 2.7 cm with mild gallium avidity.  Nonemergent evaluation with ultrasound is advised.    Physiologic uptake of the  tracer is seen within the pituitary, liver, spleen, kidneys, adrenal glands and bowel.    Incidental CT findings: Mild hepatomegaly.  Cholecystectomy.  Hysterectomy.  Gastric diverticulum noted.  Indeterminate bilateral cystic adnexal lesions, the largest in the right measuring up to 7.2 cm in anterior-posterior dimension.  Further evaluation with dedicated pelvic ultrasound is recommended as previously outlined.  Impression: Two foci of tracer avidity at the level of the 3rd portion of the duodenum or possibly the uncinate process.  These foci potentially could represent tumor deposits in this patient with abnormally elevated gastrin levels.  Correlation with endoscopy and EUS is recommended.    No other findings suggestive of somatostatin receptor rich tumor.    Enlarged left thyroid lobe with a large hypodense nodule noted inferiorly.  Nonemergent evaluation with ultrasound is advised.    Indeterminate bilateral cystic adnexal lesions, largest in the right similar in appearance to recent examination.  Dedicated evaluation with pelvic ultrasound is recommended.    This report was flagged in Epic as containing an incidental finding.    I, Musa Weller MD, attest that I reviewed and interpreted the images.    Electronically signed by resident: Swapnil Marte  Date:    02/13/2020  Time:    10:10    Electronically signed by: Musa Weller  Date:    02/13/2020  Time:    11:24       Impression:  44-year-old female was referred here for high gastrin level with significant episodes of nausea vomiting.  She is not diabetic cover she has symptoms suggestive of gastroparesis.  She had simply a significant acid reflux disease causing esophageal stricture.  Her gastrin level was found to be significantly elevated up to 1500.  She is on double dose of Protonix amd Prilosec.    She underwent gallium scan which shows uptake in the 3rd portion of duodenum close to the uncinate process.  He underwent EGD by her physician in  [place] which showed diffuse inflammation all over the stomach.    She was accompanied by her daughters.  She may need a antrectomy with the resection of these areas in the 3rd portion of the duodenum/uncinate process.  With prior to that she needs an endoscopic ultrasound to evaluate the pancreas and this lesions, gastric emptying scan and ultrasound of the neck.    She continues to smoke and I strongly recommend that she quit smoking.    Plan:       Symptomatic hypogastric any a with multiple episodes of admissions for nausea vomiting.  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  The neuroendocrine markers are pending.  She may have was a little seen type of syndrome.  Before that she needs to have gastric emptying scan and ultrasound of the thyroid.  She will also be scheduled for endoscopic ultrasound of the pancreas.  Will re-evaluate and discussed at the tumor board    The importance of nutrition physical activity and stopping smoking were all discussed.    In the CT scan done she has a adnexal mass on the right side she previously has had hysterectomy with resection of the left ovarian structures. I strongly recommended she taken appointment to see [place] gynecologist in [place] hometown which she intends to do.  Follow-up in about 3 weeks time after the completion of all this test          MAEGAN Barnett MD, FACS   Associate Professor of Surgery, [facility]   Neuroendocrine Surgery, Hepatic/Pancreatic & General Surgery   [address]   [place] [address]   ph. [phone]; [phone]   fax. [phone]

## 2020-02-17 ENCOUNTER — OFFICE VISIT (OUTPATIENT)
Dept: NEUROLOGY | Facility: HOSPITAL | Age: 45
End: 2020-02-17
Attending: SURGERY
Payer: MEDICARE

## 2020-02-17 VITALS
BODY MASS INDEX: 37.65 KG/M2 | DIASTOLIC BLOOD PRESSURE: 77 MMHG | HEART RATE: 73 BPM | HEIGHT: 65 IN | WEIGHT: 226 LBS | SYSTOLIC BLOOD PRESSURE: 122 MMHG

## 2020-02-17 DIAGNOSIS — C7A.092 MALIGNANT CARCINOID TUMOR OF STOMACH: Primary | ICD-10-CM

## 2020-02-17 DIAGNOSIS — D3A.092 CARCINOID TUMOR OF STOMACH: ICD-10-CM

## 2020-02-17 PROCEDURE — 99214 OFFICE O/P EST MOD 30 MIN: CPT | Performed by: SURGERY

## 2020-02-17 RX ORDER — ONDANSETRON 2 MG/ML
8 INJECTION INTRAMUSCULAR; INTRAVENOUS
Status: CANCELLED | OUTPATIENT
Start: 2020-02-17

## 2020-02-17 RX ORDER — ACETAMINOPHEN 500 MG
1000 TABLET ORAL
Status: CANCELLED | OUTPATIENT
Start: 2020-02-17 | End: 2020-02-17

## 2020-02-17 RX ORDER — PREGABALIN 75 MG/1
75 CAPSULE ORAL
Status: CANCELLED | OUTPATIENT
Start: 2020-02-17

## 2020-02-17 RX ORDER — FAMOTIDINE 10 MG/ML
20 INJECTION INTRAVENOUS
Status: CANCELLED | OUTPATIENT
Start: 2020-02-17

## 2020-02-17 RX ORDER — KETOROLAC TROMETHAMINE 15 MG/ML
15 INJECTION, SOLUTION INTRAMUSCULAR; INTRAVENOUS
Status: CANCELLED | OUTPATIENT
Start: 2020-02-17 | End: 2020-02-20

## 2020-02-17 RX ORDER — ENOXAPARIN SODIUM 100 MG/ML
30 INJECTION SUBCUTANEOUS
Status: CANCELLED | OUTPATIENT
Start: 2020-02-17

## 2020-02-17 NOTE — PATIENT INSTRUCTIONS
EUS [facility] will contact you to set up appt   US thyroid/Gastric Emptying - please call [phone]    Patient Instructions    Name:   rKistan Goncalves          Take a Hibiclens shower twice a day for 3 days prior to surgery, including the morning of surgery.   Gargle with Listerine twice a day for 2 days prior to surgery, including the morning of surgery.      ________3/16/20_________     Appointment with Dr. Hopson   Pre [place] for Hospital Admission - Go to 1st floor of the hospital-admitting desk. You will do paper work, get blood drawn,  get x-rays and see Anesthesia at this time.     _______3/19/20__________   CLEAR LIQUIDS all day   Start bowel prep  Ducolax Laxative tablets - 2 tablets at 12 noon  Magnesium Citrate - 1 bottle at 2 pm   Take antibiotics as prescribed   Do not eat or drink anything after midnight.                   _________3/20/20_________   Hospital Admission for surgery.  Report to 2nd floor, Same Day Surgery desk at ____7am____   Surgery is scheduled for __9am_______        [facility] [place] [address] [place]  [address]  [phone]      INFORMATION REGARDING YOUR PROCEDURE      We look forward to serving you and your family and appreciate that you have chosen [facility] for your healthcare needs. Before, during, and after your surgery, you will be cared for by skilled medical professionals. Our surgeons, anesthesiologists, nurses,  and other healthcare professionals will work with you and your family to ensure a safe, smooth and comfortable surgery and recovery.    In order to best meet your pre-admission needs, your surgeon or ordering physicians office will contact our Scheduling Office at [phone] and schedule a Pre-Procedure Appointment.  This should preferably be done 72 hours or greater before your scheduled procedure date.     During your pre-procedure visit your insurance will be verified for your procedure.  You will meet with a Registered Nurse and an Anesthesiologist or Nurse Anesthetist. If tests are required, they will be performed during your visit. Please allow about one and a half hours for this visit.      You will need to bring the following information or items with you to your Pre-Procedure Appointment:    1.  Picture Identification  2.  Insurance Card  3.  Current list of medications to include name and dosage  4.  List of allergies  5.  Orders and any other forms your doctor has given to you  6.  Copies of lab results performed at other facilities. This may include blood work, EKGs or chest xrays.   These results can be faxed to [phone].    The Pre-Op Center Registration Desk is located on the first floor of the \[facility]\"" ([address] [place] [address]) in the [facility].  The Pre-Operative Center is located on the second floor of the hospital. Someone will walk you from the registration area to the Pre-Operative Center.    Free parking is located in the front of the hospital and medical office building and is easily accessed from [address] and [address]. When you arrive, please check in at the main information desk of the hospital.    Please call Outpatient Surgery at [phone] after 12:00pm on the day before your procedure for your arrival time and updated procedure time.      Pre-Op Bathing Instructions    Before surgery, you can play an important role in your own health.    Because skin is not sterile, we need to be sure that your skin is as free of germs as possible. By following the instructions below, you can reduce the number of germs on your skin before surgery.    IMPORTANT: You will need to shower with a special soap called Hibiclens*, available at any pharmacy.  If you are allergic to Chlorhexidine (the antiseptic in Hibiclens), use an antibacterial soap such as Dial Soap for your preoperative shower.  You will shower with Hibiclens both the night before your surgery and  the morning of your surgery.  Do not use Hibiclens on the head, face or genitals to avoid injury to those areas.    STEP #1: THE NIGHT BEFORE YOUR SURGERY     1. Do not shave the area of your body where your surgery will be performed.  2. Shower and wash your hair and body as usual with your normal soap and shampoo.  3. Rinse your hair and body thoroughly after you shower to remove all soap residue.  4. With your hand, apply one packet of Hibiclens soap to the surgical site.   5. Wash the site gently for five (5) minutes. Do not scrub your skin too hard.   6. Do not wash with your regular soap after Hibiclens is used.  7. Rinse your body thoroughly.  8. Pat yourself dry with a clean, soft towel.  9. Do not use lotion, cream, or powder.  10. Wear clean clothes.    STEP #2: THE MORNING OF YOUR SURGERY     1. Repeat Step #1.    * Not to be used by people allergic to Chlorhexidine.

## 2020-02-18 ENCOUNTER — TELEPHONE (OUTPATIENT)
Dept: NEUROLOGY | Facility: HOSPITAL | Age: 45
End: 2020-02-18

## 2020-02-18 NOTE — TELEPHONE ENCOUNTER
Spoke with pt and we set up the Gastric emptying procedure 3/4/20 at 730 am. Pt verbalized understanding

## 2020-02-21 ENCOUNTER — HOSPITAL ENCOUNTER (OUTPATIENT)
Dept: RADIOLOGY | Facility: HOSPITAL | Age: 45
Discharge: HOME | End: 2020-02-21
Attending: SURGERY
Payer: MEDICARE

## 2020-02-21 DIAGNOSIS — D3A.092 CARCINOID TUMOR OF STOMACH: ICD-10-CM

## 2020-02-21 DIAGNOSIS — C7A.092 MALIGNANT CARCINOID TUMOR OF STOMACH: ICD-10-CM

## 2020-02-21 PROCEDURE — 76536 US EXAM OF HEAD AND NECK: CPT | Mod: 26,,, | Performed by: RADIOLOGY

## 2020-02-21 PROCEDURE — 76536 US EXAM OF HEAD AND NECK: CPT | Mod: TC

## 2020-02-21 PROCEDURE — 76536 US SOFT TISSUE HEAD NECK THYROID: ICD-10-PCS | Mod: 26,,, | Performed by: RADIOLOGY

## 2020-02-27 ENCOUNTER — TELEPHONE (OUTPATIENT)
Dept: GASTROENTEROLOGY | Facility: CLINIC | Age: 45
End: 2020-02-27

## 2020-02-27 DIAGNOSIS — R10.84 GENERALIZED ABDOMINAL PAIN: Primary | ICD-10-CM

## 2020-02-27 NOTE — TELEPHONE ENCOUNTER
----- Message from Vonda Beasley RN sent at 2/18/2020 11:38 AM CST -----  Hello    This pt needs to be set up for an EUS as per Dr. Emiliana Colmenares -Vonda

## 2020-02-27 NOTE — LETTER
[place] [facility]  [address], [address]  [place] [address]  Phone: [phone]                 [address]   [place] [address]          Your Upper EUS is scheduled for  03/17/2020 at 700am      at [facility] [place] which [facility] [address] [address]  You will check in at the Hospital Admit Desk located on the 1st floor of the hospital. Please contact  the office two days before procedure date to reschedule if needed  [phone]    Upper Endoscopic Ultrasound    Endoscopic ultrasound(EUS) is a procedure used to image the digestive tract, including pancreas, lesions in esophagus and stomach.  It is used to diagnose and stage cancers of the digestive tract.  If necessary, your doctor may need to take samples during the procedure.    A responsible adult (family member or friend) must come with you and transport you home.  You are not allowed to drive, take a taxi or bus or leave the Endoscopy Center alone.  If you do not have a responsible adult with you to take you home, your exam will be cancelled.     If you have questions about the cost of your procedure, you should contact your insurance company as soon as possible.  Please bring a picture ID and your insurance card.  You will sign  treatment authorization forms at check in.  It is necessary for you to sign these forms again even if you recently signed these at the time of your clinic visit.    Please follow instructions of  if you are taking anticoagulant/blood thinning medications such as Aggrenox, aspirin, Brilinta, Effient, Eliquis, Lovenox, Plavix, Pletal, Pradaxa, Ticilid, Xarelto or Coumadin.     Take blood pressure, heart, anti-rejection and or seizure medication at 600 am the morning of the procedure.    Skip your morning dose of insulin or other oral medications for diabetes the morning of the procedure unless instructed otherwise by your doctor.      Day Before The  Procedure    Eat a light evening meal and eat no solid food after 7 pm.  You may drink clear liquids including:    Water, Coffee or decaffeinated coffee (no milk or cream)  Tea, Herbal tea  Carbonated beverages (soft drinks), regular and sugar free  Gelatin  Apple Juice, grape juice, white cranberry juice  Gatorade, Power Aid, Crystal Light, Oseas Aid  Lemonade and Limeade  Bouillon, clear consomme'  Snowball, popsicles  DO NOT DRINK ANY LIQUIDS CONTAINING RED DYE  DO NOT DRINK ANY LIQUIDS NOT SPECIFICALLY LISTED  DO NOT DRINK ALCOHOL  NOTHING BY MOUTH AFTER MIDNIGHT    Day of Procedure    600 am take last dose of any medications you are allowed to take with a small amount of clear liquid

## 2020-03-04 ENCOUNTER — TELEPHONE (OUTPATIENT)
Dept: NEUROLOGY | Facility: HOSPITAL | Age: 45
End: 2020-03-04

## 2020-03-13 ENCOUNTER — TELEPHONE (OUTPATIENT)
Dept: ENDOSCOPY | Facility: HOSPITAL | Age: 45
End: 2020-03-13

## 2020-03-13 ENCOUNTER — TELEPHONE (OUTPATIENT)
Dept: SURGERY | Facility: HOSPITAL | Age: 45
End: 2020-03-13

## 2020-03-13 NOTE — TELEPHONE ENCOUNTER
Spoke with patient about arrival time @ 0700    NPO status reviewed: Patient may eat until 7:00pm.  After 7pm, pt may have CLEAR liquids ONLY until completely NPO at Midnight.    Medications: Do not take Insulin or oral diabetic medications the day of the procedure.    Take as prescribed: heart, seizure and blood pressure medication in the morning with a sip of water (less than an ounce).  Take any breathing medications and bring inhalers to hospital with you.     Leave all valuables and jewelry at home. Wear comfortable clothes to procedure to change into hospital gown.   You cannot drive for 24 hours after your procedure because you will receive sedation for your procedure to make you comfortable.    A ride must be provided at discharge.

## 2020-03-16 ENCOUNTER — HOSPITAL ENCOUNTER (OUTPATIENT)
Dept: PREADMISSION TESTING | Facility: HOSPITAL | Age: 45
Discharge: HOME | End: 2020-03-16
Attending: SURGERY
Payer: MEDICARE

## 2020-03-16 ENCOUNTER — TELEPHONE (OUTPATIENT)
Dept: NEUROLOGY | Facility: HOSPITAL | Age: 45
End: 2020-03-16

## 2020-03-16 ENCOUNTER — TELEPHONE (OUTPATIENT)
Dept: ENDOSCOPY | Facility: HOSPITAL | Age: 45
End: 2020-03-16

## 2020-03-16 NOTE — TELEPHONE ENCOUNTER
Spoke to the pt and explained to the exposure to the oncornavirus and that Dr. Lepe has cancelled all surgery for the next 2 weeks. She verbalized understanding

## 2020-03-20 ENCOUNTER — TELEPHONE (OUTPATIENT)
Dept: NEUROLOGY | Facility: HOSPITAL | Age: 45
End: 2020-03-20

## 2020-03-20 NOTE — TELEPHONE ENCOUNTER
Pt requesting status of planned surgery.  Pt informed due to covid 19, surgery has not been scheduled.  Pt to be notified with scheduled date.  Pt acknowledged understanding.

## 2020-03-20 NOTE — TELEPHONE ENCOUNTER
----- Message from Janett Francisco sent at 3/20/2020 10:22 AM CDT -----  Contact: self [phone]  Patient called in requesting to speak with you. Patient prefers to speak with a nurse. Please advise.

## 2020-05-11 ENCOUNTER — TELEPHONE (OUTPATIENT)
Dept: NEUROLOGY | Facility: HOSPITAL | Age: 45
End: 2020-05-11

## 2020-05-11 DIAGNOSIS — C7A.092 MALIGNANT CARCINOID TUMOR OF STOMACH: Primary | ICD-10-CM

## 2020-05-11 DIAGNOSIS — D3A.092 CARCINOID TUMOR OF STOMACH: ICD-10-CM

## 2020-05-11 RX ORDER — ACETAMINOPHEN 500 MG
1000 TABLET ORAL
Status: CANCELLED | OUTPATIENT
Start: 2020-05-11 | End: 2020-05-11

## 2020-05-11 RX ORDER — ENOXAPARIN SODIUM 100 MG/ML
30 INJECTION SUBCUTANEOUS
Status: CANCELLED | OUTPATIENT
Start: 2020-05-11

## 2020-05-11 RX ORDER — KETOROLAC TROMETHAMINE 15 MG/ML
15 INJECTION, SOLUTION INTRAMUSCULAR; INTRAVENOUS
Status: CANCELLED | OUTPATIENT
Start: 2020-05-11 | End: 2020-05-14

## 2020-05-11 RX ORDER — ONDANSETRON 2 MG/ML
8 INJECTION INTRAMUSCULAR; INTRAVENOUS
Status: CANCELLED | OUTPATIENT
Start: 2020-05-11

## 2020-05-11 RX ORDER — FAMOTIDINE 10 MG/ML
20 INJECTION INTRAVENOUS
Status: CANCELLED | OUTPATIENT
Start: 2020-05-11

## 2020-05-11 RX ORDER — PREGABALIN 75 MG/1
75 CAPSULE ORAL
Status: CANCELLED | OUTPATIENT
Start: 2020-05-11

## 2020-05-12 ENCOUNTER — ANESTHESIA EVENT (OUTPATIENT)
Dept: SURGERY | Facility: HOSPITAL | Age: 45
DRG: 327 | End: 2020-05-12
Payer: MEDICARE

## 2020-05-18 ENCOUNTER — TELEPHONE (OUTPATIENT)
Dept: NEUROLOGY | Facility: HOSPITAL | Age: 45
End: 2020-05-18

## 2020-05-18 NOTE — TELEPHONE ENCOUNTER
Clinic appt scheduled with pt on Monday, May 25, 2020 at 1040am.  Pt informed [facility] will contact to schedule EUS, the afternoon of May25, 2020.  Surgery to be scheduled Friday, May 29, 2020,  Pt acknowledged understanding.

## 2020-05-20 ENCOUNTER — TELEPHONE (OUTPATIENT)
Dept: GASTROENTEROLOGY | Facility: CLINIC | Age: 45
End: 2020-05-20

## 2020-05-20 NOTE — LETTER
[facility]  [address] [facility] [address]  [place] [address]  Phone: [phone]               [address] [address]   [place] [address]      Upper Endoscopic Ultrasound     [facility] [place] [facility] [address]  Clinic Office [phone]  Endoscopy Lab [phone]      Your Upper EUS is scheduled on  Dr. Menjivar on 5/25/2020 at 800am  at [facility] which is located at [address] [place] [address] [place] [address].  You will check in at the Hospital Admit Desk located on the 1st floor of the hospital. Please contact the office two days before procedure date to reschedule if needed [phone]    Upper Endoscopic Ultrasound    Endoscopic ultrasound(EUS) is a procedure used to image the digestive tract, including pancreas, lesions in esophagus and stomach.  It is used to diagnose and stage cancers of the digestive tract.  If necessary, your doctor may need to take samples during the procedure.     A responsible adult (family member or friend) must come with you and transport you home.  You are not allowed to drive, take a taxi or bus or leave the Endoscopy Center alone.  If you do not have a responsible adult with you to take you home, your exam will be cancelled.     If you have questions about the cost of your procedure, you should contact your insurance company as soon as possible.  Please bring a picture ID and your insurance card.  You will sign treatment authorization forms at check in.  It is necessary for you to sign these forms again even if you recently signed these at the time of your clinic visit.    Please follow instructions of  if you are taking anticoagulant/blood thinning medications such as Aggrenox, Brilinta, Effient, Eliquis, Lovenox, Plavix, Pletal, Pradaxa, Ticilid, Xarelto or Coumadin.     Please skip your morning dose of insulin or other oral medications for diabetes the morning of the procedure unless instructed  otherwise by your doctor. You should take your blood pressure, heart, anti-rejection and or seizure medication the morning of your procedure       The Day Before The Procedure:     Eat a light evening meal before 7 pm.   Eat no solid food after 7 pm.     From 7 pm until 12 midnight, you may drink clear liquids including:    Water, Coffee or decaffeinated coffee (no milk or cream)  Tea, Herbal tea  Carbonated beverages (soft drinks), regular and sugar free  Gelatin  Apple Juice, grape juice, white cranberry juice  Gatorade, Power Aid, Crystal Light, Oseas Aid  Lemonade and Limeade  Bouillon, clear consomme'  Snowball, popsicles    DO NOT DRINK ANY LIQUIDS CONTAINING RED DYE  DO NOT DRINK ANY LIQUIDS NOT SPECIFICALLY LISTED  DO NOT DRINK ALCOHOL     AFTER MIDNIGHT, YOU MAY HAVE NOTHING ELSE BY MOUTH    The Day of the Procedure     At 600 am, you may take the last dose of any medications you are allowed to take with a small sip of water (blood pressure, heart, anti-rejection and or seizure medication).  If you use inhalers bring them with you.   Please leave all valuables and jewelry at home.   You may call the Endoscopy department at [phone] with any questions regarding your procedure.

## 2020-05-20 NOTE — TELEPHONE ENCOUNTER
[facility] [address]  Clinic Office [phone]  Endoscopy Lab [phone]      Your Upper EUS is scheduled on  Dr. Menjivar on 5/25/2020 at 800am  at [facility] which is located at [address] [place] [id number].  You will check in at the Hospital Admit Desk located on the 1st floor of the hospital. Please contact the office two days before procedure date to reschedule if needed [phone]    Upper Endoscopic Ultrasound    Endoscopic ultrasound(EUS) is a procedure used to image the digestive tract, including pancreas, lesions in esophagus and stomach.  It is used to diagnose and stage cancers of the digestive tract.  If necessary, your doctor may need to take samples during the procedure.     A responsible adult (family member or friend) must come with you and transport you home.  You are not allowed to drive, take a taxi or bus or leave the Endoscopy Center alone.  If you do not have a responsible adult with you to take you home, your exam will be cancelled.     If you have questions about the cost of your procedure, you should contact your insurance company as soon as possible.  Please bring a picture ID and your insurance card.  You will sign treatment authorization forms at check in.  It is necessary for you to sign these forms again even if you recently signed these at the time of your clinic visit.    Please follow instructions of  if you are taking anticoagulant/blood thinning medications such as Aggrenox, Brilinta, Effient, Eliquis, Lovenox, Plavix, Pletal, Pradaxa, Ticilid, Xarelto or Coumadin.     Please skip your morning dose of insulin or other oral medications for diabetes the morning of the procedure unless instructed otherwise by your doctor. You should take your blood pressure, heart, anti-rejection and or seizure medication the morning of your procedure       The Day Before The Procedure:     Eat a light evening meal before 7  pm.   Eat no solid food after 7 pm.     From 7 pm until 12 midnight, you may drink clear liquids including:    Water, Coffee or decaffeinated coffee (no milk or cream)  Tea, Herbal tea  Carbonated beverages (soft drinks), regular and sugar free  Gelatin  Apple Juice, grape juice, white cranberry juice  Gatorade, Power Aid, Crystal Light, Oseas Aid  Lemonade and Limeade  Bouillon, clear consomme'  Snowball, popsicles    DO NOT DRINK ANY LIQUIDS CONTAINING RED DYE  DO NOT DRINK ANY LIQUIDS NOT SPECIFICALLY LISTED  DO NOT DRINK ALCOHOL     AFTER MIDNIGHT, YOU MAY HAVE NOTHING ELSE BY MOUTH    The Day of the Procedure     At 600 am, you may take the last dose of any medications you are allowed to take with a small sip of water (blood pressure, heart, anti-rejection and or seizure medication).  If you use inhalers bring them with you.   Please leave all valuables and jewelry at home.   You may call the Endoscopy department at [phone] with any questions regarding your procedure.

## 2020-05-20 NOTE — TELEPHONE ENCOUNTER
----- Message from Xuan Ortiz LPN sent at 5/18/2020  4:36 PM CDT -----  Please schedule pt with EUS on Monday, May 25, 2020.  Pt is scheduled to see Dr. Lepe on this date at 1040am, can eus be done after this appt.  Pt lives in [facility].  Surgery scheduled on Friday, May 29, 2020.  Thanks.

## 2020-05-21 ENCOUNTER — TELEPHONE (OUTPATIENT)
Dept: NEUROLOGY | Facility: HOSPITAL | Age: 45
End: 2020-05-21

## 2020-05-21 ENCOUNTER — TELEPHONE (OUTPATIENT)
Dept: ENDOSCOPY | Facility: HOSPITAL | Age: 45
End: 2020-05-21

## 2020-05-21 NOTE — TELEPHONE ENCOUNTER
Spoke with patient about arrival time @ *580.     NPO status reviewed: Patient may eat until 7:00pm.  After 7pm, pt may have CLEAR liquids ONLY until completely NPO at Midnight.    Medications: Do not take Insulin or oral diabetic medications the day of the procedure.    Take as prescribed: heart, seizure and blood pressure medication in the morning with a sip of water (less than an ounce).  Take any breathing medications and bring inhalers to hospital with you.     Leave all valuables and jewelry at home. Wear comfortable clothes to procedure to change into hospital gown.   You cannot drive for 24 hours after your procedure because you will receive sedation for your procedure to make you comfortable.    A ride must be provided at discharge.     Pt instructed on covid test to be done am of procedure.

## 2020-05-21 NOTE — TELEPHONE ENCOUNTER
Pt scheduled with eus on 5/25/20, clinic appt cancel.  Pt has preop appt scheduled  At 1130am.  Consents to be signed prior to surgery.  Pt acknowledged understanding.

## 2020-05-22 RX ORDER — SODIUM CHLORIDE 0.9 % (FLUSH) 0.9 %
10 SYRINGE (ML) INJECTION
Status: CANCELLED | OUTPATIENT
Start: 2020-05-22

## 2020-05-22 RX ORDER — SODIUM CHLORIDE 9 MG/ML
INJECTION, SOLUTION INTRAVENOUS CONTINUOUS
Status: CANCELLED | OUTPATIENT
Start: 2020-05-22

## 2020-05-25 ENCOUNTER — ANESTHESIA EVENT (OUTPATIENT)
Dept: ENDOSCOPY | Facility: HOSPITAL | Age: 45
End: 2020-05-25
Payer: MEDICARE

## 2020-05-25 ENCOUNTER — HOSPITAL ENCOUNTER (OUTPATIENT)
Facility: HOSPITAL | Age: 45
Discharge: HOME | End: 2020-05-25
Attending: INTERNAL MEDICINE | Admitting: INTERNAL MEDICINE
Payer: MEDICARE

## 2020-05-25 ENCOUNTER — HOSPITAL ENCOUNTER (OUTPATIENT)
Dept: RADIOLOGY | Facility: HOSPITAL | Age: 45
Discharge: HOME | End: 2020-05-25
Attending: SURGERY
Payer: MEDICARE

## 2020-05-25 ENCOUNTER — OFFICE VISIT (OUTPATIENT)
Dept: NEUROLOGY | Facility: HOSPITAL | Age: 45
End: 2020-05-25
Attending: SURGERY
Payer: MEDICARE

## 2020-05-25 ENCOUNTER — ANESTHESIA (OUTPATIENT)
Dept: ENDOSCOPY | Facility: HOSPITAL | Age: 45
End: 2020-05-25
Payer: MEDICARE

## 2020-05-25 VITALS
TEMPERATURE: 98 F | BODY MASS INDEX: 38.4 KG/M2 | RESPIRATION RATE: 18 BRPM | HEIGHT: 65 IN | DIASTOLIC BLOOD PRESSURE: 75 MMHG | SYSTOLIC BLOOD PRESSURE: 111 MMHG | WEIGHT: 230 LBS | HEART RATE: 66 BPM | SYSTOLIC BLOOD PRESSURE: 119 MMHG | WEIGHT: 230.5 LBS | HEART RATE: 64 BPM | TEMPERATURE: 98 F | OXYGEN SATURATION: 99 % | DIASTOLIC BLOOD PRESSURE: 67 MMHG | BODY MASS INDEX: 38.32 KG/M2 | HEIGHT: 65 IN

## 2020-05-25 DIAGNOSIS — K21.00 GASTROESOPHAGEAL REFLUX DISEASE WITH ESOPHAGITIS: ICD-10-CM

## 2020-05-25 DIAGNOSIS — R93.89 ABNORMAL FINDINGS ON IMAGING TEST: ICD-10-CM

## 2020-05-25 DIAGNOSIS — E16.4 ZOLLINGER-ELLISON SYNDROME: ICD-10-CM

## 2020-05-25 DIAGNOSIS — K22.4 ESOPHAGEAL MOTILITY DISORDER: ICD-10-CM

## 2020-05-25 DIAGNOSIS — Z01.818 PREOP TESTING: ICD-10-CM

## 2020-05-25 DIAGNOSIS — E16.4 ZOLLINGER-ELLISON SYNDROME: Primary | ICD-10-CM

## 2020-05-25 DIAGNOSIS — N83.8 MASS, OVARIAN: ICD-10-CM

## 2020-05-25 DIAGNOSIS — R13.11 DYSPHAGIA, ORAL PHASE: ICD-10-CM

## 2020-05-25 LAB — SARS-COV-2 RDRP RESP QL NAA+PROBE: NEGATIVE

## 2020-05-25 PROCEDURE — 43236 UPPR GI SCOPE W/SUBMUC INJ: CPT | Performed by: INTERNAL MEDICINE

## 2020-05-25 PROCEDURE — 63600175 PHARM REV CODE 636 W HCPCS: Performed by: NURSE ANESTHETIST, CERTIFIED REGISTERED

## 2020-05-25 PROCEDURE — 88342 CHG IMMUNOCYTOCHEMISTRY: ICD-10-PCS | Mod: 26,,, | Performed by: PATHOLOGY

## 2020-05-25 PROCEDURE — 88342 IMHCHEM/IMCYTCHM 1ST ANTB: CPT | Performed by: PATHOLOGY

## 2020-05-25 PROCEDURE — 71046 X-RAY EXAM CHEST 2 VIEWS: CPT | Mod: 26,,, | Performed by: RADIOLOGY

## 2020-05-25 PROCEDURE — 25000003 PHARM REV CODE 250: Performed by: INTERNAL MEDICINE

## 2020-05-25 PROCEDURE — 88305 TISSUE EXAM BY PATHOLOGIST: CPT | Mod: 26,,, | Performed by: PATHOLOGY

## 2020-05-25 PROCEDURE — 43259 PR ENDOSCOPIC ULTRASOUND EXAM: ICD-10-PCS | Mod: ,,, | Performed by: INTERNAL MEDICINE

## 2020-05-25 PROCEDURE — 43259 EGD US EXAM DUODENUM/JEJUNUM: CPT | Mod: ,,, | Performed by: INTERNAL MEDICINE

## 2020-05-25 PROCEDURE — 88305 TISSUE EXAM BY PATHOLOGIST: CPT | Performed by: PATHOLOGY

## 2020-05-25 PROCEDURE — 27201012 HC FORCEPS, HOT/COLD, DISP: Performed by: INTERNAL MEDICINE

## 2020-05-25 PROCEDURE — 37000008 HC ANESTHESIA 1ST 15 MINUTES: Performed by: INTERNAL MEDICINE

## 2020-05-25 PROCEDURE — 43242 EGD US FINE NEEDLE BX/ASPIR: CPT | Performed by: INTERNAL MEDICINE

## 2020-05-25 PROCEDURE — 71046 XR CHEST PA AND LATERAL PRE-OP: ICD-10-PCS | Mod: 26,,, | Performed by: RADIOLOGY

## 2020-05-25 PROCEDURE — 74220 X-RAY XM ESOPHAGUS 1CNTRST: CPT | Mod: TC

## 2020-05-25 PROCEDURE — 88341 PR IHC OR ICC EACH ADD'L SINGLE ANTIBODY  STAINPR: ICD-10-PCS | Mod: 26,,, | Performed by: PATHOLOGY

## 2020-05-25 PROCEDURE — 71046 X-RAY EXAM CHEST 2 VIEWS: CPT | Mod: TC,FY

## 2020-05-25 PROCEDURE — 43236 PR EGD, FLEX, W/SUBMUC INJECTION(S), ANY SUBSTANCE: ICD-10-PCS | Mod: 59,,, | Performed by: INTERNAL MEDICINE

## 2020-05-25 PROCEDURE — 88341 IMHCHEM/IMCYTCHM EA ADD ANTB: CPT | Performed by: PATHOLOGY

## 2020-05-25 PROCEDURE — 37000009 HC ANESTHESIA EA ADD 15 MINS: Performed by: INTERNAL MEDICINE

## 2020-05-25 PROCEDURE — 25000003 PHARM REV CODE 250: Performed by: NURSE ANESTHETIST, CERTIFIED REGISTERED

## 2020-05-25 PROCEDURE — 74220 X-RAY XM ESOPHAGUS 1CNTRST: CPT | Mod: 26,,, | Performed by: RADIOLOGY

## 2020-05-25 PROCEDURE — 88341 IMHCHEM/IMCYTCHM EA ADD ANTB: CPT | Mod: 26,,, | Performed by: PATHOLOGY

## 2020-05-25 PROCEDURE — 27201028 HC NEEDLE, SCLERO: Performed by: INTERNAL MEDICINE

## 2020-05-25 PROCEDURE — U0002 COVID-19 LAB TEST NON-CDC: HCPCS

## 2020-05-25 PROCEDURE — 43236 UPPR GI SCOPE W/SUBMUC INJ: CPT | Mod: 59,,, | Performed by: INTERNAL MEDICINE

## 2020-05-25 PROCEDURE — 43239 EGD BIOPSY SINGLE/MULTIPLE: CPT | Mod: ,,, | Performed by: INTERNAL MEDICINE

## 2020-05-25 PROCEDURE — 43239 PR EGD, FLEX, W/BIOPSY, SGL/MULTI: ICD-10-PCS | Mod: ,,, | Performed by: INTERNAL MEDICINE

## 2020-05-25 PROCEDURE — 88342 IMHCHEM/IMCYTCHM 1ST ANTB: CPT | Mod: 26,,, | Performed by: PATHOLOGY

## 2020-05-25 PROCEDURE — 74220 FL ESOPHAGRAM COMPLETE: ICD-10-PCS | Mod: 26,,, | Performed by: RADIOLOGY

## 2020-05-25 PROCEDURE — 99214 OFFICE O/P EST MOD 30 MIN: CPT | Mod: 25 | Performed by: SURGERY

## 2020-05-25 PROCEDURE — 88305 TISSUE EXAM BY PATHOLOGIST: ICD-10-PCS | Mod: 26,,, | Performed by: PATHOLOGY

## 2020-05-25 RX ORDER — PROPOFOL 10 MG/ML
VIAL (ML) INTRAVENOUS CONTINUOUS PRN
Status: DISCONTINUED | OUTPATIENT
Start: 2020-05-25 | End: 2020-05-25

## 2020-05-25 RX ORDER — DEXTROMETHORPHAN/PSEUDOEPHED 2.5-7.5/.8
DROPS ORAL
Status: COMPLETED | OUTPATIENT
Start: 2020-05-25 | End: 2020-05-25

## 2020-05-25 RX ORDER — SODIUM CHLORIDE 0.9 % (FLUSH) 0.9 %
10 SYRINGE (ML) INJECTION
Status: DISCONTINUED | OUTPATIENT
Start: 2020-05-25 | End: 2020-05-25 | Stop reason: HOSPADM

## 2020-05-25 RX ORDER — FENTANYL CITRATE 50 UG/ML
INJECTION, SOLUTION INTRAMUSCULAR; INTRAVENOUS
Status: DISCONTINUED | OUTPATIENT
Start: 2020-05-25 | End: 2020-05-25

## 2020-05-25 RX ORDER — LIDOCAINE HCL/PF 100 MG/5ML
SYRINGE (ML) INTRAVENOUS
Status: DISCONTINUED | OUTPATIENT
Start: 2020-05-25 | End: 2020-05-25

## 2020-05-25 RX ORDER — PROPOFOL 10 MG/ML
VIAL (ML) INTRAVENOUS
Status: DISCONTINUED | OUTPATIENT
Start: 2020-05-25 | End: 2020-05-25

## 2020-05-25 RX ORDER — SODIUM CHLORIDE 9 MG/ML
INJECTION, SOLUTION INTRAVENOUS CONTINUOUS
Status: DISCONTINUED | OUTPATIENT
Start: 2020-05-25 | End: 2020-05-25 | Stop reason: HOSPADM

## 2020-05-25 RX ADMIN — LIDOCAINE HYDROCHLORIDE 100 MG: 20 INJECTION, SOLUTION INTRAVENOUS at 09:05

## 2020-05-25 RX ADMIN — SIMETHICONE 66.7 MG: 20 SUSPENSION/ DROPS ORAL at 09:05

## 2020-05-25 RX ADMIN — FENTANYL CITRATE 50 MCG: 50 INJECTION, SOLUTION INTRAMUSCULAR; INTRAVENOUS at 09:05

## 2020-05-25 RX ADMIN — PROPOFOL 20 MG: 10 INJECTION, EMULSION INTRAVENOUS at 09:05

## 2020-05-25 RX ADMIN — FENTANYL CITRATE 25 MCG: 50 INJECTION, SOLUTION INTRAMUSCULAR; INTRAVENOUS at 09:05

## 2020-05-25 RX ADMIN — PROPOFOL 150 MCG/KG/MIN: 10 INJECTION, EMULSION INTRAVENOUS at 09:05

## 2020-05-25 RX ADMIN — SODIUM CHLORIDE: 0.9 INJECTION, SOLUTION INTRAVENOUS at 09:05

## 2020-05-25 RX ADMIN — PROPOFOL 70 MG: 10 INJECTION, EMULSION INTRAVENOUS at 09:05

## 2020-05-25 NOTE — ANESTHESIA POSTPROCEDURE EVALUATION
Anesthesia Post Evaluation    Patient: Kristan Goncalves    Procedure(s) Performed: Procedure(s) (LRB):  ULTRASOUND-ENDOSCOPIC-UPPER (N/A)  EGD (ESOPHAGOGASTRODUODENOSCOPY) (N/A)    Final Anesthesia Type: MAC    Patient location during evaluation: GI PACU  Patient participation: Yes- Able to Participate  Level of consciousness: awake and alert and oriented  Post-procedure vital signs: reviewed and stable  Pain management: adequate  Airway patency: patent  KESHA mitigation strategies: Multimodal analgesia  PONV status at discharge: No PONV  Anesthetic complications: no      Cardiovascular status: stable, hemodynamically stable and blood pressure returned to baseline  Respiratory status: room air, unassisted and spontaneous ventilation  Hydration status: euvolemic  Follow-up not needed.          Vitals Value Taken Time   /63 5/25/2020  9:22 AM   Temp 36.6 °C (97.9 °F) 5/25/2020  9:22 AM   Pulse 73 5/25/2020  9:22 AM   Resp 18 5/25/2020  9:22 AM   SpO2 100 % 5/25/2020  9:22 AM         No case tracking events are documented in the log.      Pain/Maia Score: No data recorded

## 2020-05-25 NOTE — TRANSFER OF CARE
"Anesthesia Transfer of Care Note    Patient: Kristan Goncalves    Procedure(s) Performed: Procedure(s) (LRB):  ULTRASOUND-ENDOSCOPIC-UPPER (N/A)  EGD (ESOPHAGOGASTRODUODENOSCOPY) (N/A)    Patient location: GI    Anesthesia Type: MAC    Transport from OR: Transported from OR on room air with adequate spontaneous ventilation    Post pain: adequate analgesia    Post assessment: no apparent anesthetic complications and tolerated procedure well    Post vital signs: stable    Level of consciousness: sedated    Nausea/Vomiting: no nausea/vomiting    Complications: none    Transfer of care protocol was followed      Last vitals:   Visit Vitals  /63   Pulse 73   Temp 36.6 °C (97.9 °F)   Resp 18   Ht 5' 5" (1.651 m)   Wt 104.3 kg (230 lb)   SpO2 100%   Breastfeeding? No   BMI 38.27 kg/m²     "

## 2020-05-25 NOTE — PATIENT INSTRUCTIONS
Labs today, 1st floor Patient Diagnostics    Chest Xray today, 1st floor Patient [facility]ostics    [place] swallow, 1st floor Patient Diagnostics    Patient Instructions    Name: ___________________          Take a Hibiclens shower twice a day for 3 days prior to surgery, including the morning of surgery.   Gargle with Listerine twice a day for 2 days prior to surgery, including the morning of surgery.      _________________          _________________   CLEAR LIQUIDS all day   Start bowel prep  Ducolax Laxative tablets - 2 tablets at 12 noon  Magnesium Citrate - 1 bottle at 2 pm   Do not eat or drink anything after midnight.                 __________________   Hospital Admission for surgery.  Report to 2nd floor, Same Day Surgery desk at ________   Surgery is scheduled for _________        [facility] [address]  [place], [place]  [id number] [phone]      INFORMATION REGARDING YOUR PROCEDURE      We look forward to serving you and your family and appreciate that you have chosen [facility] for your healthcare needs. Before, during, and after your surgery, you will be cared for by skilled medical professionals. Our surgeons, anesthesiologists, nurses,  and other healthcare professionals will work with you and your family to ensure a safe, smooth and comfortable surgery and recovery.    In order to best meet your pre-admission needs, your surgeon or ordering physicians office will contact our Scheduling Office at [phone] and schedule a Pre-Procedure Appointment.  This should preferably be done 72 hours or greater before your scheduled procedure date.     During your pre-procedure visit your insurance will be verified for your procedure. You will meet with a Registered Nurse and an Anesthesiologist or Nurse Anesthetist. If tests are required, they will be performed during your visit. Please allow about one and a half hours for this visit.      You will need to bring  the following information or items with you to your Pre-Procedure Appointment:    1.  Picture Identification  2.  Insurance Card  3.  Current list of medications to include name and dosage  4.  List of allergies  5.  Orders and any other forms your doctor has given to you  6.  Copies of lab results performed at other facilities. This may include blood work, EKGs or chest xrays.   These results can be faxed to [phone].    The Pre-Op Center Registration Desk is located on the first floor of the hospital ([address] [place] [address]) in the [facility].  The Pre-Operative Center is located on the second floor of the hospital. Someone will walk you from the registration area to the Pre-Operative Center.    Free parking is located in the front of the hospital and medical office building and is easily accessed from [address] and [address]. When you arrive, please check in at the main information desk of the hospital.    Please call Outpatient Surgery at [phone] after 12:00pm on the day before your procedure for your arrival time and updated procedure time.      Pre-Op Bathing Instructions    Before surgery, you can play an important role in your own health.    Because skin is not sterile, we need to be sure that your skin is as free of germs as possible. By following the instructions below, you can reduce the number of germs on your skin before surgery.    IMPORTANT: You will need to shower with a special soap called Hibiclens*, available at any pharmacy.  If you are allergic to Chlorhexidine (the antiseptic in Hibiclens), use an antibacterial soap such as Dial Soap for your preoperative shower.  You will shower with Hibiclens both the night before your surgery and the morning of your surgery.  Do not use Hibiclens on the head, face or genitals to avoid injury to those areas.    STEP #1: THE NIGHT BEFORE YOUR SURGERY     1. Do not shave the area of your body where your surgery will be  performed.  2. Shower and wash your hair and body as usual with your normal soap and shampoo.  3. Rinse your hair and body thoroughly after you shower to remove all soap residue.  4. With your hand, apply one packet of Hibiclens soap to the surgical site.   5. Wash the site gently for five (5) minutes. Do not scrub your skin too hard.   6. Do not wash with your regular soap after Hibiclens is used.  7. Rinse your body thoroughly.  8. Pat yourself dry with a clean, soft towel.  9. Do not use lotion, cream, or powder.  10. Wear clean clothes.    STEP #2: THE MORNING OF YOUR SURGERY     1. Repeat Step #1.    * Not to be used by people allergic to Chlorhexidine.

## 2020-05-25 NOTE — ANESTHESIA PREPROCEDURE EVALUATION
05/25/2020  Kristan Goncalves is a 44 y.o., female. Scheduled for EUS.    Anesthesia Evaluation    I have reviewed the Patient Summary Reports.        Review of Systems  Anesthesia Hx:  No problems with previous Anesthesia   Denies Personal Hx of Anesthesia complications.   EENT/Dental:EENT/Dental Normal   Cardiovascular:  Cardiovascular Normal     Pulmonary:   Sleep Apnea    Renal/:  Renal/ Normal     Hepatic/GI:   PUD, Zollinger-Lambert   Musculoskeletal:  Musculoskeletal Normal    Neurological:   Seizures Last seizure about 11 months ago, no recent changes to meds.   Dermatological:  Skin Normal    Psych:  Psychiatric Normal           Physical Exam  General:  Obesity    Airway/Jaw/Neck:  Airway Findings: Mallampati: II TM Distance: Normal, at least 6 cm  Jaw/Neck Findings:  Neck ROM: Normal ROM     Eyes/Ears/Nose:  EYES/EARS/NOSE FINDINGS: Normal   Dental:  Dental Findings: Denies loose teeth   Chest/Lungs:  Chest/Lungs Clear    Heart/Vascular:  Heart Findings: Normal    Abdomen:  Abdomen Findings: Normal    Musculoskeletal:  Musculoskeletal Findings: Normal   Skin:  Skin Findings: Normal    Mental Status:  Mental Status Findings:  Cooperative, Alert and Oriented         Anesthesia Plan  Type of Anesthesia, risks & benefits discussed:  Anesthesia Type:  MAC  Patient's Preference:   Intra-op Monitoring Plan: standard ASA monitors  Intra-op Monitoring Plan Comments:   Post Op Pain Control Plan:   Post Op Pain Control Plan Comments:   Induction:   IV  Beta Blocker:         Informed Consent: Patient understands risks and agrees with Anesthesia plan.  Questions answered. Anesthesia consent signed with patient.  ASA Score: 3     Day of Surgery Review of History & Physical:            Ready For Surgery From Anesthesia Perspective.

## 2020-05-25 NOTE — DISCHARGE SUMMARY
Discharge Summary/Instructions after an Endoscopic Procedure    Patient Name: Kristan Goncalves  Patient MRN: [id number]  Patient YOB: 1975    Monday, May 25, 2020  Marcelo Menjivar MD    Your health is very important to us during the Covid Crisis. Following your procedure today, you will receive a daily text for 2 weeks asking about signs or symptoms of Covid 19.  Please respond to this text when you receive it so we can follow up and keep you as safe as possible.     RESTRICTIONS:  During your procedure today, you received medications for sedation.  These medications may affect your judgment, balance and coordination.  Therefore, for 24 hours, you have the following restrictions:     - DO NOT drive a car, operate machinery, make legal/financial decisions, sign important papers or drink alcohol.      ACTIVITY:  Today: no heavy lifting, straining or running due to procedural sedation/anesthesia.  The following day: return to full activity including work.    DIET:  Eat and drink normally unless instructed otherwise.     TREATMENT FOR COMMON SIDE EFFECTS:  - Mild abdominal pain, nausea, belching, bloating or excessive gas:  rest, eat lightly and use a heating pad.  - Sore Throat: treat with throat lozenges and/or gargle with warm salt water.  - Because air was used during the procedure, expelling large amounts of air from your rectum or belching is normal.  - If a bowel prep was taken, you may not have a bowel movement for 1-3 days.  This is normal.      SYMPTOMS TO WATCH FOR AND REPORT TO YOUR PHYSICIAN:  1. Abdominal pain or bloating, other than gas cramps.  2. Chest pain.  3. Back pain.  4. Signs of infection such as: chills or fever occurring within 24 hours after the procedure.  5. Rectal bleeding, which would show as bright red, maroon, or black stools. (A tablespoon of blood from the rectum is not serious, especially if hemorrhoids are present.)  6. Vomiting.  7. Weakness or dizziness.      GO DIRECTLY  TO THE NEAREST EMERGENCY ROOM IF YOU HAVE ANY OF THE FOLLOWING:     Difficulty breathing              Chills and/or fever over 101 F   Persistent vomiting and/or vomiting blood   Severe abdominal pain   Severe chest pain   Black, tarry stools   Bleeding- more than one tablespoon   Any other symptom or condition that you feel may need urgent attention    Your doctor recommends these additional instructions:  If any biopsies were taken, your doctors clinic will contact you in 1 to 2 weeks with any results.    - Discharge patient to home (ambulatory).   - Await path results.   - Return to referring physician.    For questions, problems or results please call your physician - Marcelo Menjivar MD at Work:  [phone].    EMERGENCY PHONE NUMBER: [phone],  LAB RESULTS: [phone]    IF A COMPLICATION OR EMERGENCY SITUATION ARISES AND YOU ARE UNABLE TO REACH YOUR PHYSICIAN - GO DIRECTLY TO THE EMERGENCY ROOM.

## 2020-05-25 NOTE — [employer] PATIENT INSTRUCTIONS
Discharge Summary/Instructions after an Endoscopic Procedure  Patient Name: Kristan Goncalves  Patient MRN: [id number]  Patient YOB: 1975  Monday, May 25, 2020  Marcelo Menjivar MD  Your health is very important to us during the Covid Crisis. Following your   procedure today, you will receive a daily text for 2 weeks asking about   signs or symptoms of Covid 19.  Please respond to this text when you   receive it so we can follow up and keep you as safe as possible.   RESTRICTIONS:  During your procedure today, you received medications for sedation.  These   medications may affect your judgment, balance and coordination.  Therefore,   for 24 hours, you have the following restrictions:   - DO NOT drive a car, operate machinery, make legal/financial decisions,   sign important papers or drink alcohol.    ACTIVITY:  Today: no heavy lifting, straining or running due to procedural   sedation/anesthesia.  The following day: return to full activity including work.  DIET:  Eat and drink normally unless instructed otherwise.     TREATMENT FOR COMMON SIDE EFFECTS:  - Mild abdominal pain, nausea, belching, bloating or excessive gas:  rest,   eat lightly and use a heating pad.  - Sore Throat: treat with throat lozenges and/or gargle with warm salt   water.  - Because air was used during the procedure, expelling large amounts of air   from your rectum or belching is normal.  - If a bowel prep was taken, you may not have a bowel movement for 1-3 days.    This is normal.  SYMPTOMS TO WATCH FOR AND REPORT TO YOUR PHYSICIAN:  1. Abdominal pain or bloating, other than gas cramps.  2. Chest pain.  3. Back pain.  4. Signs of infection such as: chills or fever occurring within 24 hours   after the procedure.  5. Rectal bleeding, which would show as bright red, maroon, or black stools.   (A tablespoon of blood from the rectum is not serious, especially if   hemorrhoids are present.)  6. Vomiting.  7. Weakness or dizziness.  GO  DIRECTLY TO THE NEAREST EMERGENCY ROOM IF YOU HAVE ANY OF THE FOLLOWING:      Difficulty breathing              Chills and/or fever over 101 F   Persistent vomiting and/or vomiting blood   Severe abdominal pain   Severe chest pain   Black, tarry stools   Bleeding- more than one tablespoon   Any other symptom or condition that you feel may need urgent attention  Your doctor recommends these additional instructions:  If any biopsies were taken, your doctors clinic will contact you in 1 to 2   weeks with any results.  - Discharge patient to home (ambulatory).   - Await path results.   - Return to referring physician.  For questions, problems or results please call your physician - Marcelo Menjivar MD at Work:  [phone].  EMERGENCY PHONE NUMBER: [phone],  LAB RESULTS: [phone]  IF A COMPLICATION OR EMERGENCY SITUATION ARISES AND YOU ARE UNABLE TO REACH   YOUR PHYSICIAN - GO DIRECTLY TO THE EMERGENCY ROOM.  Marcelo Menjivar MD  5/25/2020 10:38:03 AM  This report has been verified and signed electronically.  [employer]

## 2020-05-25 NOTE — ANESTHESIA PREPROCEDURE EVALUATION
05/25/2020  Kristan Goncalves is a 44 y.o., female. Robotic Gasterectomy.    Pre-op Assessment    I have reviewed the Patient Summary Reports.         Review of Systems  Anesthesia Hx:  No problems with previous Anesthesia   Denies Personal Hx of Anesthesia complications.   Hematology/Oncology:  Hematology Normal        EENT/Dental:EENT/Dental Normal   Cardiovascular:  Cardiovascular Normal     Pulmonary:   Sleep Apnea    Renal/:  Renal/ Normal     Hepatic/GI:   PUD, GERD Zollinger-Lambert - N/V, abd pain   Musculoskeletal:  Musculoskeletal Normal    Neurological:   Seizures Last seizure about 11 months ago, no recent changes to meds.   Dermatological:  Skin Normal    Psych:  Psychiatric Normal           Physical Exam  General:  Obesity    Airway/Jaw/Neck:  Airway Findings: Mallampati: II TM Distance: Normal, at least 6 cm  Jaw/Neck Findings:  Neck ROM: Normal ROM     Eyes/Ears/Nose:  EYES/EARS/NOSE FINDINGS: Normal   Dental:  Dental Findings: Denies loose teeth   Chest/Lungs:  Chest/Lungs Clear    Heart/Vascular:  Heart Findings: Normal    Abdomen:  Abdomen Findings: Normal    Musculoskeletal:  Musculoskeletal Findings: Normal   Skin:  Skin Findings: Normal    Mental Status:  Mental Status Findings:  Cooperative, Alert and Oriented         Anesthesia Plan  Type of Anesthesia, risks & benefits discussed:  Anesthesia Type:  general  Patient's Preference:   Intra-op Monitoring Plan: standard ASA monitors  Intra-op Monitoring Plan Comments:   Post Op Pain Control Plan: per primary service following discharge from PACU, multimodal analgesia and IV/PO Opioids PRN  Post Op Pain Control Plan Comments:   Induction:   IV  Beta Blocker:         Informed Consent: Patient understands risks and agrees with Anesthesia plan.  Questions answered. Anesthesia consent signed with patient.  ASA Score: 3     Day of  Surgery Review of History & Physical: I have interviewed and examined the patient. I have reviewed the patient's H&P dated:  There are no significant changes.      Anesthesia Plan Notes: Consent signed on 5-25-20.        Ready For Surgery From Anesthesia Perspective.

## 2020-05-25 NOTE — H&P (VIEW-ONLY)
"NOLANETS:  [facility]  A collaboration between [facility] and [facility]      PATIENT: Kristan Goncalves  MRN: [id number]  DATE: 5/25/2020    Subjective:      Chief Complaint: No chief complaint on file.  intermittent episodes of tiredness, loss of appetite  Nausea vomiting  Now nausea, vomiting improving    Vitals: Blood pressure 119/75, pulse 64, temperature 97.7 °F (36.5 °C), temperature source Oral, height 5' 5" (1.651 m), weight 104.5 kg (230 lb 7.9 oz).     ECOG Score: 1 - Symptomatic but completely ambulatory    Diagnosis: No diagnosis found.     Interval History:     Oncologic History:   Oncologic History    Oncologic Treatment    Pathology      Past Medical History:  Past Medical History:   Diagnosis Date    Elevated gastrin level     Seizures     Sleep apnea     Zollinger-Lambert syndrome        Past Surgical History:  Past Surgical History:   Procedure Laterality Date    CHOLECYSTECTOMY      HYSTERECTOMY         Family History:  History reviewed. No pertinent family history.    Allergies:  Patient has no known allergies.    Medications:   Current Outpatient Medications   Medication Sig    amitriptyline (ELAVIL) 10 MG tablet Take 1 tablet (10 mg total) by mouth nightly as needed for Insomnia.    HYDROcodone-acetaminophen (NORCO) 7.5-325 mg per tablet Take 1 tablet by mouth every 6 (six) hours as needed for Pain.    lacosamide (VIMPAT) 200 mg Tab tablet Take by mouth every 12 (twelve) hours.    ondansetron (ZOFRAN-ODT) 4 MG TbDL Take 1 tablet (4 mg total) by mouth every 8 (eight) hours as needed.    pantoprazole (PROTONIX) 40 MG tablet Take 1 tablet (40 mg total) by mouth once daily.    phenytoin (DILANTIN) 100 MG ER capsule Take 1 capsule (100 mg total) by mouth 3 (three) times daily.    prochlorperazine (COMPAZINE) 5 MG tablet Take 5 mg by mouth every 4 (four) hours.    promethazine (PHENERGAN) " 25 MG suppository Place 1 suppository (25 mg total) rectally every 6 (six) hours as needed for Nausea.    topiramate (TOPAMAX) 200 MG Tab Take by mouth 2 (two) times daily.     No current facility-administered medications for this visit.         Review of Systems   Constitutional: Negative for activity change, appetite change, chills, diaphoresis, fatigue, fever and unexpected weight change.   HENT: Positive for ear discharge. Negative for congestion, dental problem, drooling, ear pain, facial swelling, hearing loss, mouth sores, nosebleeds, postnasal drip, rhinorrhea, sinus pain, sneezing, tinnitus and trouble swallowing.    Eyes: Negative for photophobia, pain, discharge, redness, itching and visual disturbance.   Respiratory: Negative for apnea, cough, choking, chest tightness, shortness of breath and wheezing.    Cardiovascular: Negative for palpitations and leg swelling.   Gastrointestinal: Negative for abdominal distention, abdominal pain, anal bleeding, blood in stool, constipation, diarrhea, nausea, rectal pain and vomiting.   Endocrine: Negative.    Genitourinary: Negative.    Musculoskeletal: Negative for arthralgias, joint swelling, myalgias, neck pain and neck stiffness.   Skin: Negative for color change, pallor and rash.   Allergic/Immunologic: Negative.    Neurological: Positive for seizures. Negative for tremors, syncope, weakness, light-headedness, numbness and headaches.   Hematological: Negative.    Psychiatric/Behavioral: Negative for agitation, behavioral problems, confusion and decreased concentration.      Objective:      Physical Exam   Constitutional: She appears well-developed and well-nourished.   HENT:   Head: Normocephalic.   Right Ear: External ear normal.   Left Ear: External ear normal.   Eyes: Pupils are equal, round, and reactive to light. Conjunctivae are normal.   Neck: Normal range of motion.   Cardiovascular: Normal rate and regular rhythm.   Pulmonary/Chest: Effort normal and  breath sounds normal.   Abdominal: Soft. Bowel sounds are normal.   Musculoskeletal: Normal range of motion.   Neurological: She is alert.   Skin: Skin is warm.      Assessment:       No diagnosis found.    Laboratory Data:       Scans:   US Soft Tissue Head Neck Thyroid  Narrative: EXAMINATION:  US SOFT TISSUE HEAD NECK THYROID    CLINICAL HISTORY:  Malignant carcinoid tumor of the stomach    TECHNIQUE:  Duplex scan was performed, grayscale imaging and color flow.    COMPARISON:  PET 68GA dotatate 02/13/2020    FINDINGS:.  The thyroid gland is upper normal limit in size    The right thyroid lobe measures 5.4 x 1.9 x 1.7 cm.    The left thyroid lobe measures 5.4 x 2.9 x 2.8 cm.    Thyroid parenchyma is homogeneous, without increased perfusion.    There are several thyroid nodules.    Within the right thyroid lobe, there are 2 subcentimeter mainly cystic nodules and a 0.7 cm solid nodule at the upper pole.  None of them qualify for FNA.    Within the isthmus, there are 2 predominantly cystic subcentimeter nodules, none of them qualify for FNA.    Within the right thyroid lobe, there are 3 predominantly solid nodules, the largest measures 2.1 cm at the inferior thyroid lobe.  It is taller than wide.  FNA recommended.  The other 2 nodules requires follow-up at 12 months.    No adenopathy seen.  Impression: Multinodular thyroid.    FNA of the largest left thyroid lobe nodule located at the inferior pole is recommended.    Follow-up recommended for the remainder of the nodules.    This report was flagged in Epic as abnormal.    Electronically signed by: Jenni Serrato MD  Date:    02/21/2020  Time:    10:10       Impression:  44-year-old female found to have multiple GI erosions esophagitis high gastrin level was referred to the neuroendocrine Center.  He had significant symptoms of gastroesophageal reflux with associated fatigue loss of appetite and and multiple episodes of nausea and vomiting.  She was urgently  scheduled for surgery in March and had to be postponed because of the COVID pandemic    She is on proton pump inhibitor and Carafate and her GI symptoms are reasonably well controlled.  She was evaluated here she has significantly elevated pancreas statin level, gallium scan shows uptake in the peripancreatic area and the endoscopic ultrasound showed duodenal nodules with peripancreatic mass.  A symptoms are somewhat better.  Next for she has a history of seizure disorder for which he takes 3 medications and they are stable.  She had enlarged cystic lesion the ovary    She has findings of peripancreatic uptake with high gastrin level and negative intrinsic factor.  The endoscopic ultrasound done today shows 2 nodules in the 2nd part of the duodenum.  Overall the symptoms of gastroesophageal reflux or improved and she is on Prilosec with Carafate.  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  The plan would be to explore her and do debulking of these lesions    She is getting a preop done today.  If possible obtain esophageal swallow study to assess the gastroesophageal reflux symptom significance.  The plan will be to proceed with possible gastrectomy excision of the lesion in the peripancreatic area, lymphadenectomy and duodenal lesion excision    I talked about the risk of surgery such as bleeding infection DVT PE MI stroke anastomotic leaks requiring redo the need for post gastrectomy diet after surgery the need for nutrition supplementation as she would end up losing some weight after the surgery.    After discussing risks and benefits and entire course of action consent was obtained    Plan:       Preop labs and workup today  NPO after the Thursday midnight  For possible gastrectomy and lymphadenectomy on this Thursday          MAEGAN Barnett MD, FACS   Associate Professor of Surgery, [facility]   Neuroendocrine  Surgery, Hepatic/Pancreatic & General Surgery   [address]   [place] [address]   ph. [phone]; [phone]   fax. [phone]

## 2020-05-25 NOTE — PROGRESS NOTES
"NOLANETS:  [facility]  A collaboration between [facility] and [facility]      PATIENT: Kristan Goncalves  MRN: [id number]  DATE: 5/25/2020    Subjective:      Chief Complaint: No chief complaint on file.  intermittent episodes of tiredness, loss of appetite  Nausea vomiting  Now nausea, vomiting improving    Vitals: Blood pressure 119/75, pulse 64, temperature 97.7 °F (36.5 °C), temperature source Oral, height 5' 5" (1.651 m), weight 104.5 kg (230 lb 7.9 oz).     ECOG Score: 1 - Symptomatic but completely ambulatory    Diagnosis: No diagnosis found.     Interval History:     Oncologic History:   Oncologic History    Oncologic Treatment    Pathology      Past Medical History:  Past Medical History:   Diagnosis Date    Elevated gastrin level     Seizures     Sleep apnea     Zollinger-Lambert syndrome        Past Surgical History:  Past Surgical History:   Procedure Laterality Date    CHOLECYSTECTOMY      HYSTERECTOMY         Family History:  History reviewed. No pertinent family history.    Allergies:  Patient has no known allergies.    Medications:   Current Outpatient Medications   Medication Sig    amitriptyline (ELAVIL) 10 MG tablet Take 1 tablet (10 mg total) by mouth nightly as needed for Insomnia.    HYDROcodone-acetaminophen (NORCO) 7.5-325 mg per tablet Take 1 tablet by mouth every 6 (six) hours as needed for Pain.    lacosamide (VIMPAT) 200 mg Tab tablet Take by mouth every 12 (twelve) hours.    ondansetron (ZOFRAN-ODT) 4 MG TbDL Take 1 tablet (4 mg total) by mouth every 8 (eight) hours as needed.    pantoprazole (PROTONIX) 40 MG tablet Take 1 tablet (40 mg total) by mouth once daily.    phenytoin (DILANTIN) 100 MG ER capsule Take 1 capsule (100 mg total) by mouth 3 (three) times daily.    prochlorperazine (COMPAZINE) 5 MG tablet Take 5 mg by mouth every 4 (four) hours.    promethazine (PHENERGAN) " 25 MG suppository Place 1 suppository (25 mg total) rectally every 6 (six) hours as needed for Nausea.    topiramate (TOPAMAX) 200 MG Tab Take by mouth 2 (two) times daily.     No current facility-administered medications for this visit.         Review of Systems   Constitutional: Negative for activity change, appetite change, chills, diaphoresis, fatigue, fever and unexpected weight change.   HENT: Positive for ear discharge. Negative for congestion, dental problem, drooling, ear pain, facial swelling, hearing loss, mouth sores, nosebleeds, postnasal drip, rhinorrhea, sinus pain, sneezing, tinnitus and trouble swallowing.    Eyes: Negative for photophobia, pain, discharge, redness, itching and visual disturbance.   Respiratory: Negative for apnea, cough, choking, chest tightness, shortness of breath and wheezing.    Cardiovascular: Negative for palpitations and leg swelling.   Gastrointestinal: Negative for abdominal distention, abdominal pain, anal bleeding, blood in stool, constipation, diarrhea, nausea, rectal pain and vomiting.   Endocrine: Negative.    Genitourinary: Negative.    Musculoskeletal: Negative for arthralgias, joint swelling, myalgias, neck pain and neck stiffness.   Skin: Negative for color change, pallor and rash.   Allergic/Immunologic: Negative.    Neurological: Positive for seizures. Negative for tremors, syncope, weakness, light-headedness, numbness and headaches.   Hematological: Negative.    Psychiatric/Behavioral: Negative for agitation, behavioral problems, confusion and decreased concentration.      Objective:      Physical Exam   Constitutional: She appears well-developed and well-nourished.   HENT:   Head: Normocephalic.   Right Ear: External ear normal.   Left Ear: External ear normal.   Eyes: Pupils are equal, round, and reactive to light. Conjunctivae are normal.   Neck: Normal range of motion.   Cardiovascular: Normal rate and regular rhythm.   Pulmonary/Chest: Effort normal and  breath sounds normal.   Abdominal: Soft. Bowel sounds are normal.   Musculoskeletal: Normal range of motion.   Neurological: She is alert.   Skin: Skin is warm.      Assessment:       No diagnosis found.    Laboratory Data:       Scans:   US Soft Tissue Head Neck Thyroid  Narrative: EXAMINATION:  US SOFT TISSUE HEAD NECK THYROID    CLINICAL HISTORY:  Malignant carcinoid tumor of the stomach    TECHNIQUE:  Duplex scan was performed, grayscale imaging and color flow.    COMPARISON:  PET 68GA dotatate 02/13/2020    FINDINGS:.  The thyroid gland is upper normal limit in size    The right thyroid lobe measures 5.4 x 1.9 x 1.7 cm.    The left thyroid lobe measures 5.4 x 2.9 x 2.8 cm.    Thyroid parenchyma is homogeneous, without increased perfusion.    There are several thyroid nodules.    Within the right thyroid lobe, there are 2 subcentimeter mainly cystic nodules and a 0.7 cm solid nodule at the upper pole.  None of them qualify for FNA.    Within the isthmus, there are 2 predominantly cystic subcentimeter nodules, none of them qualify for FNA.    Within the right thyroid lobe, there are 3 predominantly solid nodules, the largest measures 2.1 cm at the inferior thyroid lobe.  It is taller than wide.  FNA recommended.  The other 2 nodules requires follow-up at 12 months.    No adenopathy seen.  Impression: Multinodular thyroid.    FNA of the largest left thyroid lobe nodule located at the inferior pole is recommended.    Follow-up recommended for the remainder of the nodules.    This report was flagged in Epic as abnormal.    Electronically signed by: Jenni Serrato MD  Date:    02/21/2020  Time:    10:10       Impression:  44-year-old female found to have multiple GI erosions esophagitis high gastrin level was referred to the neuroendocrine Center.  He had significant symptoms of gastroesophageal reflux with associated fatigue loss of appetite and and multiple episodes of nausea and vomiting.  She was urgently  scheduled for surgery in March and had to be postponed because of the COVID pandemic    She is on proton pump inhibitor and Carafate and her GI symptoms are reasonably well controlled.  She was evaluated here she has significantly elevated pancreas statin level, gallium scan shows uptake in the peripancreatic area and the endoscopic ultrasound showed duodenal nodules with peripancreatic mass.  A symptoms are somewhat better.  Next for she has a history of seizure disorder for which he takes 3 medications and they are stable.  She had enlarged cystic lesion the ovary    She has findings of peripancreatic uptake with high gastrin level and negative intrinsic factor.  The endoscopic ultrasound done today shows 2 nodules in the 2nd part of the duodenum.  Overall the symptoms of gastroesophageal reflux or improved and she is on Prilosec with Carafate.  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  The plan would be to explore her and do debulking of these lesions    She is getting a preop done today.  If possible obtain esophageal swallow study to assess the gastroesophageal reflux symptom significance.  The plan will be to proceed with possible gastrectomy excision of the lesion in the peripancreatic area, lymphadenectomy and duodenal lesion excision    I talked about the risk of surgery such as bleeding infection DVT PE MI stroke anastomotic leaks requiring redo the need for post gastrectomy diet after surgery the need for nutrition supplementation as she would end up losing some weight after the surgery.    After discussing risks and benefits and entire course of action consent was obtained    Plan:       Preop labs and workup today  NPO after the Thursday midnight  For possible gastrectomy and lymphadenectomy on this Thursday          MAEGAN Barnett MD, FACS   Associate Professor of Surgery, [facility]   Neuroendocrine  Surgery, Hepatic/Pancreatic & General Surgery   [address]   [place] [address]   ph. [phone]; [phone]   fax. [phone]

## 2020-05-25 NOTE — H&P
History & Physical - Short Stay  Gastroenterology      SUBJECTIVE:     Procedure: EUS    Chief Complaint/Indication for Procedure: Abnormal imaging    History of Present Illness:  Patient is a 44 y.o. female presents with elevated gastrin and abnormal imaging.     NM PET 2/13/2020    Two foci of tracer avidity at the level of the 3rd portion of the duodenum or possibly the uncinate process.  These foci potentially could represent tumor deposits in this patient with abnormally elevated gastrin levels.  Correlation with endoscopy and EUS is recommended.    PTA Medications   Medication Sig    amitriptyline (ELAVIL) 10 MG tablet Take 1 tablet (10 mg total) by mouth nightly as needed for Insomnia.    cholestyramine, with sugar, 4 gram Powd Take by mouth.    dicyclomine (BENTYL) 10 MG capsule Take 1 capsule (10 mg total) by mouth 4 (four) times daily before meals and nightly. (Patient not taking: Reported on 2/17/2020)    HYDROcodone-acetaminophen (NORCO) 7.5-325 mg per tablet Take 1 tablet by mouth every 6 (six) hours as needed for Pain.    lacosamide (VIMPAT) 200 mg Tab tablet Take by mouth every 12 (twelve) hours.    ondansetron (ZOFRAN-ODT) 4 MG TbDL Take 1 tablet (4 mg total) by mouth every 8 (eight) hours as needed.    pantoprazole (PROTONIX) 40 MG tablet Take 1 tablet (40 mg total) by mouth once daily.    phenytoin (DILANTIN) 100 MG ER capsule Take 1 capsule (100 mg total) by mouth 3 (three) times daily.    prochlorperazine (COMPAZINE) 5 MG tablet Take 5 mg by mouth every 4 (four) hours.    promethazine (PHENERGAN) 25 MG suppository Place 1 suppository (25 mg total) rectally every 6 (six) hours as needed for Nausea.    topiramate (TOPAMAX) 200 MG Tab Take by mouth 2 (two) times daily.       Review of patient's allergies indicates:  No Known Allergies     Past Medical History:   Diagnosis Date    Elevated gastrin level     Seizures     Sleep apnea      Past Surgical History:   Procedure Laterality Date     CHOLECYSTOTOMY      HYSTERECTOMY       No family history on file.  Social History     Tobacco Use    Smoking status: Former Smoker   Substance Use Topics    Alcohol use: Not on file    Drug use: Not on file       Review of Systems:  Constitutional: no fever or chills  Respiratory: no cough or shortness of breath  Cardiovascular: no chest pain or palpitations  Gastrointestinal: positive for diarrhea    OBJECTIVE:     Vital Signs (Most Recent)       Physical Exam:  General: well developed, well nourished  Lungs:  normal respiratory effort  Heart: regular rate, S1, S2 normal    Laboratory  CBC: No results for input(s): WBC, RBC, HGB, HCT, PLT, MCV, MCH, MCHC in the last 168 hours.  CMP: No results for input(s): GLU, CALCIUM, ALBUMIN, PROT, NA, K, CO2, CL, BUN, CREATININE, ALKPHOS, ALT, AST, BILITOT in the last 168 hours.  Coagulation: No results for input(s): LABPROT, INR, APTT in the last 168 hours.      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    Plan: EUS    Anesthesia Plan: MAC    ASA Grade: ASA 3 - Patient with moderate systemic disease with functional limitations     The impression and plan was discussed in detail with the patient. All questions have been answered and the patient voices understanding of our plan at this point. The risk of the procedure was discussed in detail which includes but not limited to bleeding, infection, perforation in some cases requiring surgery with its spectrum of complications.

## 2020-05-26 ENCOUNTER — ANESTHESIA (OUTPATIENT)
Dept: SURGERY | Facility: HOSPITAL | Age: 45
DRG: 327 | End: 2020-05-26
Payer: MEDICARE

## 2020-05-28 NOTE — TELEPHONE ENCOUNTER
Attempt to contact pt to confirm surgery on Tuesday, May 26, 2020.  EUS on Monday, May 18, 2020, pending approval by [facility]. Request sent to Melody singht to be scheduled with pt on Monday, May 18, 2020 prior to EUS to obtain consents.  Unable to leave message, voicemail not set up. Will call pt again on tomorrow, 5/12/20.      n/a

## 2020-05-29 ENCOUNTER — HOSPITAL ENCOUNTER (INPATIENT)
Facility: HOSPITAL | Age: 45
LOS: 7 days | Discharge: HOME | DRG: 327 | End: 2020-06-05
Attending: SURGERY | Admitting: SURGERY
Payer: MEDICARE

## 2020-05-29 DIAGNOSIS — D3A.092 CARCINOID TUMOR OF STOMACH: ICD-10-CM

## 2020-05-29 DIAGNOSIS — E16.4 ZOLLINGER-ELLISON SYNDROME: Primary | ICD-10-CM

## 2020-05-29 DIAGNOSIS — R13.19 ESOPHAGEAL DYSPHAGIA: ICD-10-CM

## 2020-05-29 DIAGNOSIS — E34.0 DUODENAL CARCINOID SYNDROME: ICD-10-CM

## 2020-05-29 DIAGNOSIS — C7A.092 MALIGNANT CARCINOID TUMOR OF STOMACH: ICD-10-CM

## 2020-05-29 LAB
ANION GAP SERPL CALC-SCNC: 9 MMOL/L (ref 8–16)
BASOPHILS # BLD AUTO: 0.02 K/UL (ref 0–0.2)
BASOPHILS NFR BLD: 0.1 % (ref 0–1.9)
BUN SERPL-MCNC: 12 MG/DL (ref 6–20)
CALCIUM SERPL-MCNC: 7.9 MG/DL (ref 8.7–10.5)
CHLORIDE SERPL-SCNC: 112 MMOL/L (ref 95–110)
CO2 SERPL-SCNC: 17 MMOL/L (ref 23–29)
CREAT SERPL-MCNC: 1 MG/DL (ref 0.5–1.4)
DIFFERENTIAL METHOD: ABNORMAL
EOSINOPHIL # BLD AUTO: 0 K/UL (ref 0–0.5)
EOSINOPHIL NFR BLD: 0 % (ref 0–8)
ERYTHROCYTE [DISTWIDTH] IN BLOOD BY AUTOMATED COUNT: 13.5 % (ref 11.5–14.5)
EST. GFR  (AFRICAN AMERICAN): >60 ML/MIN/1.73 M^2
EST. GFR  (NON AFRICAN AMERICAN): >60 ML/MIN/1.73 M^2
GLUCOSE SERPL-MCNC: 178 MG/DL (ref 70–110)
HCT VFR BLD AUTO: 39.6 % (ref 37–48.5)
HGB BLD-MCNC: 12.3 G/DL (ref 12–16)
IMM GRANULOCYTES # BLD AUTO: 0.07 K/UL (ref 0–0.04)
IMM GRANULOCYTES NFR BLD AUTO: 0.5 % (ref 0–0.5)
LYMPHOCYTES # BLD AUTO: 0.8 K/UL (ref 1–4.8)
LYMPHOCYTES NFR BLD: 5.9 % (ref 18–48)
MCH RBC QN AUTO: 27.1 PG (ref 27–31)
MCHC RBC AUTO-ENTMCNC: 31.1 G/DL (ref 32–36)
MCV RBC AUTO: 87 FL (ref 82–98)
MONOCYTES # BLD AUTO: 0.2 K/UL (ref 0.3–1)
MONOCYTES NFR BLD: 1.3 % (ref 4–15)
NEUTROPHILS # BLD AUTO: 12.7 K/UL (ref 1.8–7.7)
NEUTROPHILS NFR BLD: 92.2 % (ref 38–73)
NRBC BLD-RTO: 0 /100 WBC
PLATELET # BLD AUTO: 188 K/UL (ref 150–350)
PMV BLD AUTO: 10.2 FL (ref 9.2–12.9)
POCT GLUCOSE: 182 MG/DL (ref 70–110)
POTASSIUM SERPL-SCNC: 4.5 MMOL/L (ref 3.5–5.1)
RBC # BLD AUTO: 4.54 M/UL (ref 4–5.4)
SARS-COV-2 RDRP RESP QL NAA+PROBE: NEGATIVE
SODIUM SERPL-SCNC: 138 MMOL/L (ref 136–145)
WBC # BLD AUTO: 13.76 K/UL (ref 3.9–12.7)

## 2020-05-29 PROCEDURE — 88307 TISSUE EXAM BY PATHOLOGIST: CPT | Performed by: PATHOLOGY

## 2020-05-29 PROCEDURE — 63600175 PHARM REV CODE 636 W HCPCS: Performed by: NURSE ANESTHETIST, CERTIFIED REGISTERED

## 2020-05-29 PROCEDURE — 27000221 HC OXYGEN, UP TO 24 HOURS

## 2020-05-29 PROCEDURE — 88304 TISSUE EXAM BY PATHOLOGIST: CPT | Performed by: PATHOLOGY

## 2020-05-29 PROCEDURE — S0020 INJECTION, BUPIVICAINE HYDRO: HCPCS | Performed by: SURGERY

## 2020-05-29 PROCEDURE — 93005 ELECTROCARDIOGRAM TRACING: CPT

## 2020-05-29 PROCEDURE — S0171 BUMETANIDE 0.5 MG: HCPCS | Performed by: NURSE ANESTHETIST, CERTIFIED REGISTERED

## 2020-05-29 PROCEDURE — 36000710: Performed by: SURGERY

## 2020-05-29 PROCEDURE — 25000003 PHARM REV CODE 250: Performed by: SURGERY

## 2020-05-29 PROCEDURE — 94761 N-INVAS EAR/PLS OXIMETRY MLT: CPT

## 2020-05-29 PROCEDURE — 94770 HC EXHALED C02 TEST: CPT

## 2020-05-29 PROCEDURE — 36000711: Performed by: SURGERY

## 2020-05-29 PROCEDURE — 63600175 PHARM REV CODE 636 W HCPCS: Performed by: SURGERY

## 2020-05-29 PROCEDURE — U0002 COVID-19 LAB TEST NON-CDC: HCPCS

## 2020-05-29 PROCEDURE — 99900035 HC TECH TIME PER 15 MIN (STAT)

## 2020-05-29 PROCEDURE — 88305 TISSUE EXAM BY PATHOLOGIST: ICD-10-PCS | Mod: 26,,, | Performed by: PATHOLOGY

## 2020-05-29 PROCEDURE — 88305 TISSUE EXAM BY PATHOLOGIST: CPT | Mod: 26,,, | Performed by: PATHOLOGY

## 2020-05-29 PROCEDURE — 80048 BASIC METABOLIC PNL TOTAL CA: CPT

## 2020-05-29 PROCEDURE — S0028 INJECTION, FAMOTIDINE, 20 MG: HCPCS | Performed by: SURGERY

## 2020-05-29 PROCEDURE — 25000003 PHARM REV CODE 250: Performed by: NURSE ANESTHETIST, CERTIFIED REGISTERED

## 2020-05-29 PROCEDURE — 37000009 HC ANESTHESIA EA ADD 15 MINS: Performed by: SURGERY

## 2020-05-29 PROCEDURE — 88305 TISSUE EXAM BY PATHOLOGIST: CPT | Mod: 59 | Performed by: PATHOLOGY

## 2020-05-29 PROCEDURE — C9290 INJ, BUPIVACAINE LIPOSOME: HCPCS | Performed by: SURGERY

## 2020-05-29 PROCEDURE — 88307 PR  SURG PATH,LEVEL V: ICD-10-PCS | Mod: 26,,, | Performed by: PATHOLOGY

## 2020-05-29 PROCEDURE — P9045 ALBUMIN (HUMAN), 5%, 250 ML: HCPCS | Mod: JG | Performed by: NURSE ANESTHETIST, CERTIFIED REGISTERED

## 2020-05-29 PROCEDURE — 88360 TUMOR IMMUNOHISTOCHEM/MANUAL: CPT | Performed by: PATHOLOGY

## 2020-05-29 PROCEDURE — 88304 TISSUE EXAM BY PATHOLOGIST: CPT | Mod: 26,,, | Performed by: PATHOLOGY

## 2020-05-29 PROCEDURE — 27201423 OPTIME MED/SURG SUP & DEVICES STERILE SUPPLY: Performed by: SURGERY

## 2020-05-29 PROCEDURE — 37000008 HC ANESTHESIA 1ST 15 MINUTES: Performed by: SURGERY

## 2020-05-29 PROCEDURE — 88304 PR  SURG PATH,LEVEL III: ICD-10-PCS | Mod: 26,,, | Performed by: PATHOLOGY

## 2020-05-29 PROCEDURE — 36415 COLL VENOUS BLD VENIPUNCTURE: CPT

## 2020-05-29 PROCEDURE — 85025 COMPLETE CBC W/AUTO DIFF WBC: CPT

## 2020-05-29 PROCEDURE — C1729 CATH, DRAINAGE: HCPCS | Performed by: SURGERY

## 2020-05-29 PROCEDURE — 20000000 HC ICU ROOM

## 2020-05-29 PROCEDURE — 88307 TISSUE EXAM BY PATHOLOGIST: CPT | Mod: 26,,, | Performed by: PATHOLOGY

## 2020-05-29 RX ORDER — BUMETANIDE 0.25 MG/ML
INJECTION INTRAMUSCULAR; INTRAVENOUS
Status: DISCONTINUED | OUTPATIENT
Start: 2020-05-29 | End: 2020-05-29

## 2020-05-29 RX ORDER — ONDANSETRON HYDROCHLORIDE 2 MG/ML
INJECTION, SOLUTION INTRAMUSCULAR; INTRAVENOUS
Status: DISCONTINUED | OUTPATIENT
Start: 2020-05-29 | End: 2020-05-29

## 2020-05-29 RX ORDER — ACETAMINOPHEN 500 MG
1000 TABLET ORAL
Status: COMPLETED | OUTPATIENT
Start: 2020-05-29 | End: 2020-05-29

## 2020-05-29 RX ORDER — DEXAMETHASONE SODIUM PHOSPHATE 4 MG/ML
INJECTION, SOLUTION INTRA-ARTICULAR; INTRALESIONAL; INTRAMUSCULAR; INTRAVENOUS; SOFT TISSUE
Status: DISCONTINUED | OUTPATIENT
Start: 2020-05-29 | End: 2020-05-29

## 2020-05-29 RX ORDER — FENTANYL CITRATE 50 UG/ML
INJECTION, SOLUTION INTRAMUSCULAR; INTRAVENOUS
Status: DISCONTINUED | OUTPATIENT
Start: 2020-05-29 | End: 2020-05-29

## 2020-05-29 RX ORDER — ESMOLOL HYDROCHLORIDE 10 MG/ML
INJECTION INTRAVENOUS
Status: DISCONTINUED | OUTPATIENT
Start: 2020-05-29 | End: 2020-05-29

## 2020-05-29 RX ORDER — ALBUTEROL SULFATE 2.5 MG/.5ML
2.5 SOLUTION RESPIRATORY (INHALATION) EVERY 4 HOURS PRN
Status: DISCONTINUED | OUTPATIENT
Start: 2020-05-29 | End: 2020-06-05 | Stop reason: HOSPADM

## 2020-05-29 RX ORDER — PROPOFOL 10 MG/ML
VIAL (ML) INTRAVENOUS
Status: DISCONTINUED | OUTPATIENT
Start: 2020-05-29 | End: 2020-05-29

## 2020-05-29 RX ORDER — KETOROLAC TROMETHAMINE 30 MG/ML
10 INJECTION, SOLUTION INTRAMUSCULAR; INTRAVENOUS EVERY 6 HOURS
Status: COMPLETED | OUTPATIENT
Start: 2020-05-29 | End: 2020-05-31

## 2020-05-29 RX ORDER — KETOROLAC TROMETHAMINE 30 MG/ML
15 INJECTION, SOLUTION INTRAMUSCULAR; INTRAVENOUS
Status: DISCONTINUED | OUTPATIENT
Start: 2020-05-29 | End: 2020-05-29 | Stop reason: HOSPADM

## 2020-05-29 RX ORDER — ONDANSETRON 2 MG/ML
8 INJECTION INTRAMUSCULAR; INTRAVENOUS
Status: COMPLETED | OUTPATIENT
Start: 2020-05-29 | End: 2020-05-29

## 2020-05-29 RX ORDER — SODIUM CHLORIDE 9 MG/ML
INJECTION, SOLUTION INTRAVENOUS CONTINUOUS PRN
Status: DISCONTINUED | OUTPATIENT
Start: 2020-05-29 | End: 2020-05-29

## 2020-05-29 RX ORDER — MAGNESIUM SULFATE HEPTAHYDRATE 40 MG/ML
2 INJECTION, SOLUTION INTRAVENOUS ONCE
Status: COMPLETED | OUTPATIENT
Start: 2020-05-29 | End: 2020-05-29

## 2020-05-29 RX ORDER — NEOSTIGMINE METHYLSULFATE 1 MG/ML
INJECTION, SOLUTION INTRAVENOUS
Status: DISCONTINUED | OUTPATIENT
Start: 2020-05-29 | End: 2020-05-29

## 2020-05-29 RX ORDER — NALOXONE HCL 0.4 MG/ML
0.02 VIAL (ML) INJECTION
Status: DISCONTINUED | OUTPATIENT
Start: 2020-05-29 | End: 2020-06-05 | Stop reason: HOSPADM

## 2020-05-29 RX ORDER — FAMOTIDINE 10 MG/ML
20 INJECTION INTRAVENOUS
Status: COMPLETED | OUTPATIENT
Start: 2020-05-29 | End: 2020-05-29

## 2020-05-29 RX ORDER — HYDROMORPHONE HCL IN 0.9% NACL 6 MG/30 ML
PATIENT CONTROLLED ANALGESIA SYRINGE INTRAVENOUS CONTINUOUS
Status: DISCONTINUED | OUTPATIENT
Start: 2020-05-29 | End: 2020-05-31

## 2020-05-29 RX ORDER — PHENYTOIN SODIUM 100 MG/1
100 CAPSULE, EXTENDED RELEASE ORAL 3 TIMES DAILY
Status: DISCONTINUED | OUTPATIENT
Start: 2020-05-29 | End: 2020-06-05 | Stop reason: HOSPADM

## 2020-05-29 RX ORDER — PREGABALIN 75 MG/1
75 CAPSULE ORAL 2 TIMES DAILY
Status: DISCONTINUED | OUTPATIENT
Start: 2020-05-29 | End: 2020-06-05 | Stop reason: HOSPADM

## 2020-05-29 RX ORDER — LIDOCAINE HCL/PF 100 MG/5ML
SYRINGE (ML) INTRAVENOUS
Status: DISCONTINUED | OUTPATIENT
Start: 2020-05-29 | End: 2020-05-29

## 2020-05-29 RX ORDER — BACITRACIN 50000 [IU]/1
INJECTION, POWDER, FOR SOLUTION INTRAMUSCULAR
Status: DISCONTINUED | OUTPATIENT
Start: 2020-05-29 | End: 2020-05-29 | Stop reason: HOSPADM

## 2020-05-29 RX ORDER — DIPHENHYDRAMINE HYDROCHLORIDE 50 MG/ML
12.5 INJECTION INTRAMUSCULAR; INTRAVENOUS EVERY 4 HOURS PRN
Status: DISCONTINUED | OUTPATIENT
Start: 2020-05-29 | End: 2020-06-05 | Stop reason: HOSPADM

## 2020-05-29 RX ORDER — MIDAZOLAM HYDROCHLORIDE 1 MG/ML
INJECTION INTRAMUSCULAR; INTRAVENOUS
Status: DISCONTINUED | OUTPATIENT
Start: 2020-05-29 | End: 2020-05-29

## 2020-05-29 RX ORDER — DEXTROSE MONOHYDRATE, SODIUM CHLORIDE, AND POTASSIUM CHLORIDE 50; 1.49; 4.5 G/1000ML; G/1000ML; G/1000ML
INJECTION, SOLUTION INTRAVENOUS CONTINUOUS
Status: DISCONTINUED | OUTPATIENT
Start: 2020-05-29 | End: 2020-06-04

## 2020-05-29 RX ORDER — HYDROMORPHONE HYDROCHLORIDE 1 MG/ML
0.5 INJECTION, SOLUTION INTRAMUSCULAR; INTRAVENOUS; SUBCUTANEOUS ONCE
Status: COMPLETED | OUTPATIENT
Start: 2020-05-29 | End: 2020-05-29

## 2020-05-29 RX ORDER — TOPIRAMATE 100 MG/1
200 TABLET, FILM COATED ORAL 2 TIMES DAILY
Status: DISCONTINUED | OUTPATIENT
Start: 2020-05-29 | End: 2020-06-05 | Stop reason: HOSPADM

## 2020-05-29 RX ORDER — BUPIVACAINE HYDROCHLORIDE 2.5 MG/ML
INJECTION, SOLUTION INFILTRATION; PERINEURAL
Status: DISCONTINUED | OUTPATIENT
Start: 2020-05-29 | End: 2020-05-29 | Stop reason: HOSPADM

## 2020-05-29 RX ORDER — VECURONIUM BROMIDE FOR INJECTION 1 MG/ML
INJECTION, POWDER, LYOPHILIZED, FOR SOLUTION INTRAVENOUS
Status: DISCONTINUED | OUTPATIENT
Start: 2020-05-29 | End: 2020-05-29

## 2020-05-29 RX ORDER — ONDANSETRON 2 MG/ML
4 INJECTION INTRAMUSCULAR; INTRAVENOUS EVERY 6 HOURS PRN
Status: DISCONTINUED | OUTPATIENT
Start: 2020-05-29 | End: 2020-06-05 | Stop reason: HOSPADM

## 2020-05-29 RX ORDER — ACETAMINOPHEN 10 MG/ML
INJECTION, SOLUTION INTRAVENOUS
Status: DISCONTINUED | OUTPATIENT
Start: 2020-05-29 | End: 2020-05-29

## 2020-05-29 RX ORDER — SODIUM CHLORIDE, SODIUM LACTATE, POTASSIUM CHLORIDE, CALCIUM CHLORIDE 600; 310; 30; 20 MG/100ML; MG/100ML; MG/100ML; MG/100ML
INJECTION, SOLUTION INTRAVENOUS CONTINUOUS PRN
Status: DISCONTINUED | OUTPATIENT
Start: 2020-05-29 | End: 2020-05-29

## 2020-05-29 RX ORDER — GLYCOPYRROLATE 0.2 MG/ML
INJECTION INTRAMUSCULAR; INTRAVENOUS
Status: DISCONTINUED | OUTPATIENT
Start: 2020-05-29 | End: 2020-05-29

## 2020-05-29 RX ORDER — ROCURONIUM BROMIDE 10 MG/ML
INJECTION, SOLUTION INTRAVENOUS
Status: DISCONTINUED | OUTPATIENT
Start: 2020-05-29 | End: 2020-05-29

## 2020-05-29 RX ORDER — HYDRALAZINE HYDROCHLORIDE 20 MG/ML
5 INJECTION INTRAMUSCULAR; INTRAVENOUS
Status: DISCONTINUED | OUTPATIENT
Start: 2020-05-29 | End: 2020-06-05 | Stop reason: HOSPADM

## 2020-05-29 RX ORDER — ENOXAPARIN SODIUM 100 MG/ML
30 INJECTION SUBCUTANEOUS
Status: COMPLETED | OUTPATIENT
Start: 2020-05-29 | End: 2020-05-29

## 2020-05-29 RX ORDER — PREGABALIN 75 MG/1
75 CAPSULE ORAL
Status: DISCONTINUED | OUTPATIENT
Start: 2020-05-29 | End: 2020-05-29 | Stop reason: HOSPADM

## 2020-05-29 RX ORDER — AMITRIPTYLINE HYDROCHLORIDE 10 MG/1
10 TABLET, FILM COATED ORAL NIGHTLY PRN
Status: DISCONTINUED | OUTPATIENT
Start: 2020-05-29 | End: 2020-06-05 | Stop reason: HOSPADM

## 2020-05-29 RX ORDER — ALBUMIN HUMAN 50 G/1000ML
SOLUTION INTRAVENOUS CONTINUOUS PRN
Status: DISCONTINUED | OUTPATIENT
Start: 2020-05-29 | End: 2020-05-29

## 2020-05-29 RX ORDER — LABETALOL HYDROCHLORIDE 5 MG/ML
5 INJECTION, SOLUTION INTRAVENOUS
Status: DISCONTINUED | OUTPATIENT
Start: 2020-05-29 | End: 2020-06-05 | Stop reason: HOSPADM

## 2020-05-29 RX ORDER — SUCCINYLCHOLINE CHLORIDE 20 MG/ML
INJECTION INTRAMUSCULAR; INTRAVENOUS
Status: DISCONTINUED | OUTPATIENT
Start: 2020-05-29 | End: 2020-05-29

## 2020-05-29 RX ADMIN — SODIUM CHLORIDE, SODIUM LACTATE, POTASSIUM CHLORIDE, AND CALCIUM CHLORIDE: .6; .31; .03; .02 INJECTION, SOLUTION INTRAVENOUS at 08:05

## 2020-05-29 RX ADMIN — PROPOFOL 200 MG: 10 INJECTION, EMULSION INTRAVENOUS at 09:05

## 2020-05-29 RX ADMIN — ROCURONIUM BROMIDE 45 MG: 10 INJECTION, SOLUTION INTRAVENOUS at 09:05

## 2020-05-29 RX ADMIN — ONDANSETRON 8 MG: 2 INJECTION INTRAMUSCULAR; INTRAVENOUS at 08:05

## 2020-05-29 RX ADMIN — KETOROLAC TROMETHAMINE 10 MG: 30 INJECTION, SOLUTION INTRAMUSCULAR at 06:05

## 2020-05-29 RX ADMIN — ROCURONIUM BROMIDE 25 MG: 10 INJECTION, SOLUTION INTRAVENOUS at 10:05

## 2020-05-29 RX ADMIN — FENTANYL CITRATE 100 MCG: 50 INJECTION, SOLUTION INTRAMUSCULAR; INTRAVENOUS at 09:05

## 2020-05-29 RX ADMIN — FENTANYL CITRATE 50 MCG: 50 INJECTION, SOLUTION INTRAMUSCULAR; INTRAVENOUS at 11:05

## 2020-05-29 RX ADMIN — ROCURONIUM BROMIDE 5 MG: 10 INJECTION, SOLUTION INTRAVENOUS at 09:05

## 2020-05-29 RX ADMIN — KETOROLAC TROMETHAMINE 10 MG: 30 INJECTION, SOLUTION INTRAMUSCULAR at 11:05

## 2020-05-29 RX ADMIN — GLYCOPYRROLATE 0.4 MG: 0.2 INJECTION, SOLUTION INTRAMUSCULAR; INTRAVENOUS at 03:05

## 2020-05-29 RX ADMIN — AMPICILLIN SODIUM AND SULBACTAM SODIUM 3 G: 2; 1 INJECTION, POWDER, FOR SOLUTION INTRAMUSCULAR; INTRAVENOUS at 03:05

## 2020-05-29 RX ADMIN — HYDROMORPHONE HYDROCHLORIDE 0.5 MG: 1 INJECTION, SOLUTION INTRAMUSCULAR; INTRAVENOUS; SUBCUTANEOUS at 05:05

## 2020-05-29 RX ADMIN — Medication: at 05:05

## 2020-05-29 RX ADMIN — DEXAMETHASONE SODIUM PHOSPHATE 8 MG: 4 INJECTION, SOLUTION INTRA-ARTICULAR; INTRALESIONAL; INTRAMUSCULAR; INTRAVENOUS; SOFT TISSUE at 10:05

## 2020-05-29 RX ADMIN — SODIUM CHLORIDE, SODIUM LACTATE, POTASSIUM CHLORIDE, AND CALCIUM CHLORIDE: .6; .31; .03; .02 INJECTION, SOLUTION INTRAVENOUS at 03:05

## 2020-05-29 RX ADMIN — NEOSTIGMINE METHYLSULFATE 3 MG: 1 INJECTION INTRAVENOUS at 03:05

## 2020-05-29 RX ADMIN — ACETAMINOPHEN 1000 MG: 10 INJECTION, SOLUTION INTRAVENOUS at 03:05

## 2020-05-29 RX ADMIN — BUMETANIDE 1 MG: 0.25 INJECTION INTRAMUSCULAR; INTRAVENOUS at 04:05

## 2020-05-29 RX ADMIN — SODIUM CHLORIDE, SODIUM LACTATE, POTASSIUM CHLORIDE, AND CALCIUM CHLORIDE: .6; .31; .03; .02 INJECTION, SOLUTION INTRAVENOUS at 11:05

## 2020-05-29 RX ADMIN — ESMOLOL HYDROCHLORIDE 20 MG: 10 INJECTION, SOLUTION INTRAVENOUS at 03:05

## 2020-05-29 RX ADMIN — AMPICILLIN SODIUM AND SULBACTAM SODIUM 3 G: 2; 1 INJECTION, POWDER, FOR SOLUTION INTRAMUSCULAR; INTRAVENOUS at 12:05

## 2020-05-29 RX ADMIN — FENTANYL CITRATE 50 MCG: 50 INJECTION, SOLUTION INTRAMUSCULAR; INTRAVENOUS at 01:05

## 2020-05-29 RX ADMIN — OCTREOTIDE ACETATE: 500 INJECTION, SOLUTION INTRAVENOUS; SUBCUTANEOUS at 08:05

## 2020-05-29 RX ADMIN — CALCIUM GLUCONATE 1 G: 98 INJECTION, SOLUTION INTRAVENOUS at 05:05

## 2020-05-29 RX ADMIN — MIDAZOLAM HYDROCHLORIDE 3 MG: 1 INJECTION, SOLUTION INTRAMUSCULAR; INTRAVENOUS at 09:05

## 2020-05-29 RX ADMIN — DEXTROSE MONOHYDRATE, SODIUM CHLORIDE, AND POTASSIUM CHLORIDE: 50; 4.5; 1.49 INJECTION, SOLUTION INTRAVENOUS at 05:05

## 2020-05-29 RX ADMIN — ALBUMIN (HUMAN): 12.5 SOLUTION INTRAVENOUS at 10:05

## 2020-05-29 RX ADMIN — FENTANYL CITRATE 150 MCG: 50 INJECTION, SOLUTION INTRAMUSCULAR; INTRAVENOUS at 09:05

## 2020-05-29 RX ADMIN — VECURONIUM BROMIDE 3 MG: 10 INJECTION, POWDER, LYOPHILIZED, FOR SOLUTION INTRAVENOUS at 01:05

## 2020-05-29 RX ADMIN — ACETAMINOPHEN 1000 MG: 500 TABLET ORAL at 08:05

## 2020-05-29 RX ADMIN — VECURONIUM BROMIDE 3 MG: 10 INJECTION, POWDER, LYOPHILIZED, FOR SOLUTION INTRAVENOUS at 02:05

## 2020-05-29 RX ADMIN — LIDOCAINE HYDROCHLORIDE 100 MG: 20 INJECTION, SOLUTION INTRAVENOUS at 09:05

## 2020-05-29 RX ADMIN — PHENYTOIN SODIUM 100 MG: 100 CAPSULE, EXTENDED RELEASE ORAL at 08:05

## 2020-05-29 RX ADMIN — SUCCINYLCHOLINE CHLORIDE 120 MG: 20 INJECTION, SOLUTION INTRAMUSCULAR; INTRAVENOUS at 09:05

## 2020-05-29 RX ADMIN — AMPICILLIN SODIUM AND SULBACTAM SODIUM 3 G: 2; 1 INJECTION, POWDER, FOR SOLUTION INTRAMUSCULAR; INTRAVENOUS at 07:05

## 2020-05-29 RX ADMIN — FAMOTIDINE 20 MG: 10 INJECTION, SOLUTION INTRAVENOUS at 08:05

## 2020-05-29 RX ADMIN — HYDROCORTISONE SODIUM SUCCINATE 50 MG: 100 INJECTION, POWDER, FOR SOLUTION INTRAMUSCULAR; INTRAVENOUS at 08:05

## 2020-05-29 RX ADMIN — TOPIRAMATE 200 MG: 100 TABLET, FILM COATED ORAL at 08:05

## 2020-05-29 RX ADMIN — OCTREOTIDE ACETATE 250 MCG/HR: 1000 INJECTION, SOLUTION INTRAVENOUS; SUBCUTANEOUS at 09:05

## 2020-05-29 RX ADMIN — ESMOLOL HYDROCHLORIDE 20 MG: 10 INJECTION, SOLUTION INTRAVENOUS at 04:05

## 2020-05-29 RX ADMIN — AMPICILLIN SODIUM AND SULBACTAM SODIUM 3 G: 2; 1 INJECTION, POWDER, FOR SOLUTION INTRAMUSCULAR; INTRAVENOUS at 09:05

## 2020-05-29 RX ADMIN — ONDANSETRON 8 MG: 2 INJECTION, SOLUTION INTRAMUSCULAR; INTRAVENOUS at 03:05

## 2020-05-29 RX ADMIN — SODIUM CHLORIDE: 0.9 INJECTION, SOLUTION INTRAVENOUS at 09:05

## 2020-05-29 RX ADMIN — PREGABALIN 75 MG: 75 CAPSULE ORAL at 09:05

## 2020-05-29 RX ADMIN — ENOXAPARIN SODIUM 30 MG: 100 INJECTION SUBCUTANEOUS at 08:05

## 2020-05-29 RX ADMIN — CALCIUM GLUCONATE 1 G: 98 INJECTION, SOLUTION INTRAVENOUS at 06:05

## 2020-05-29 RX ADMIN — PREGABALIN 75 MG: 75 CAPSULE ORAL at 08:05

## 2020-05-29 RX ADMIN — OCTREOTIDE ACETATE 250 MCG/HR: 1000 INJECTION, SOLUTION INTRAVENOUS; SUBCUTANEOUS at 05:05

## 2020-05-29 RX ADMIN — VECURONIUM BROMIDE 4 MG: 10 INJECTION, POWDER, LYOPHILIZED, FOR SOLUTION INTRAVENOUS at 12:05

## 2020-05-29 RX ADMIN — KETOROLAC TROMETHAMINE 15 MG: 30 INJECTION, SOLUTION INTRAMUSCULAR at 08:05

## 2020-05-29 RX ADMIN — MAGNESIUM SULFATE IN WATER 2 G: 40 INJECTION, SOLUTION INTRAVENOUS at 05:05

## 2020-05-29 NOTE — TRANSFER OF CARE
"Anesthesia Transfer of Care Note    Patient: Kristan Goncalves    Procedure(s) Performed: Procedure(s) (LRB):  DUODENECTOMY ROBOTIC (N/A)  OOPHORECTOMY (Right)  ROBOTIC APPENDECTOMY (N/A)  REMOVAL cyst (Right)  LAPAROTOMY, EXPLORATORY, DUODENAL TUMOR RESECTION, PANCREATIC TUMOR RESECTION, LIVER ULTRASOUND    Patient location: ICU    Anesthesia Type: general    Transport from OR: Transported from OR on 6-10 L/min O2 by face mask with adequate spontaneous ventilation. Continuous ECG monitoring in transport. Continuous SpO2 monitoring in transport. Continuos invasive BP monitoring in transport    Post pain: adequate analgesia    Post assessment: no apparent anesthetic complications    Post vital signs: stable    Level of consciousness: responds to stimulation and sedated    Nausea/Vomiting: no nausea/vomiting    Complications: none    Transfer of care protocol was followedComments: Report given at bedside. Pt resting comfortably in bed, VSS.       Last vitals:   Visit Vitals  /67   Pulse 89   Temp 37.2 °C (99 °F) (Axillary)   Resp 20   Ht 5' 5" (1.651 m)   Wt 104 kg (229 lb 4.5 oz)   SpO2 97%   Breastfeeding? No   BMI 38.15 kg/m²     "

## 2020-05-29 NOTE — OP NOTE
[facility]  Surgery Department  Operative Note    SUMMARY     Date of Procedure: 5/29/2020     Procedure:   1.  Robotic right salpingo oophorectomy   2.  Lysis of adhesion  2.  Appendectomy  3.  Robotic pancreatic tumor resection from uncinate process and lymphadenectomy  4.  Robotic duodenostomy  5.  Robotic assisted duodenal closure in 2 layers over NG tube  6.  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  Large right ovarian cyst with normal left ovary  2.  Enlarged thickened appendix  3.  Esophageal Pelvic adhesions between small bowel and the pelvic cavity  4.  About 1 x 1 cm tumor arising from the inside uncinate process on the inferior border lying on the 3rd part of the duodenum  5.  About 8 x 8 mm tumor between the 3rd part of the duodenum the base of the mesentery close to superior mesenteric artery  6.  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  Normal liver  8.  Gastroesophageal dysmotility    Anesthesia: General    History and indication:  This is a 44-year-old female had a severe heartburn upper abdominal pain and she underwent evaluation in her hometown had EGD showed multiple ulceration from esophagus to jejunum.  She was started on proton pump inhibitor since she had a multiple ulcerations and erosions a gastrin level was ordered and was found to be more than 1200 range.  She was referred to hours center for further management.  After starting Protonix has symptoms started to improve however she continued to have intermittent episodes of nausea vomiting.    She underwent complete workup with MRI,  gallium scan and neuroendocrine markers.  Her gastrin level was elevated and her intrinsic factor antibody was negative.  The gallium scan showed 2 lesions at the inferior body of the pancreas between the 3rd part of duodenum and pancreas.  An upper endoscopy showed 1 by 1 cm duodenal carcinoid tumor at at the level of 2nd and 3rd portion of the duodenum.  Her symptom complex was consistent with Zollinger Lambert syndrome.     Since she was symptomatic I evaluated her for surgery.  I talked to her about the surgery the risk involved that the benefits and advantages.  I emphasized the risk of surgery such as bleeding infection DVT PE MI stroke anastomotic leaks requiring redo surgeries.  However because of the COVID pandemic her surgery was postponed indefinitely.  During the last 2 months the symptoms were stable she was talking higher dose of Protonix and Nexium.  I did barium swallow study which shows poor emptying of food at the GE junction.    I again evaluated her talked to her about the surgery the advantage of surgery and the risks associated with surgery such as bleeding infection DVT PE MI stroke anastomosed Modic leaks requiring more surgery worsening of some dysphagia.  My plan was to do a minimally invasive surgery I talked to her about the the down sides of minimally invasive that she has a lesion in the duodenum and 2 small lesions at the inferior border of the pancreas which could be missed easily at the time of minimally invasive surgery.  However with her issues with swallowing and a history of seizures it is work pursuing minimally invasive option.  If she continues to have any problem in the future then she can undergo a formal.  I explained to her the reasoning and the benefits the approach patient was agreeable and consented for the procedure    Finding:  Large right ovarian cyst appeared to be simple however multiloculated.  Her left ovary was normal she had extensive adhesions in the pelvic  cavity.  Appendix was found to be enlarged and thickened with no acute inflammation.  She had 1 tumor about 21.2 x 1 cm arising from the uncinate process between the uncinate process and 3rd part of the duodenum another tumor about 8 x 8 mm appeared to be jazmin metastatic disease between the 3rd part of the duodenum and the inferior body of the pancreas.  She underwent endoscopic ultrasound 3 days back for the purpose of tattooing the duodenal lesion.  The whole 2nd and 3rd portion of the duodenum was tattooed and also the adjoining the surrounding areas which made identification difficult.  Therefore a robotic assisted procedure was accomplished.     Procedure:  Patient was brought to the operating room and was placed in supine position she was then sedated and intubated.  A Patel catheter was placed in the bladder.  The abdomen was prepped and draped in the usual sterile manner.  Time-out was done to verify the ID of the patient and procedure and everyone concurred.    A small incision was made supraumbilically.  Veress needle was inserted into the abdominal cavity and was insufflated with CO2.  After adequate insufflation 8 mm robotic port was placed under direct vision.  Under direct vision a 8 mm robotic port at the left midclavicular line and another 8 mm port at the right midclavicular lines were placed at the level of umbilicus.  Patient was placed in the Trendelenburg position.  The robot was then docked.  A bipolar forceps through the left midclavicular port and a vessel sealer through the right midclavicular ports were introduced and used    Patient had a large right ovarian cyst multiloculated.  She had extensive adhesions in the pelvic cavity.  A meticulous lysis of adhesion was carried out the small bowel was dissected off from the ovarian cyst ovarian cyst seemed to be adhered to the pelvic cavity to the sidewalls of the pelvis under the bladder careful dissection was done and the whole was ovarian  cyst complex was dissected out.  The left ovary appeared normal.  At this right ovarian cyst appeared benign.  After complete mobilization using vessel sealer the ovarian pedicle was taken down.  The specimen was then placed in the endobag and was left inside which will be taken out towards the end of the surgery.  The appendix was found to be thickened and enlarged.  A dissection was made between the appendix and the base of the appendix.  Three silk ties were placed proximally and appendix was transected the mesoappendix was taken down using vessel sealer.  The excess Silk ties were transected and removed.  The appendix was placed in the endobag and was retrieved outside.  The pelvic cavity was irrigated lots of antibiotic solution and was completely drained meticulous hemostasis was accomplished.    Following this the robotic arms were then undocked.  Patient position was changed.  The head of the bed was elevated with right side elevated.  The robotic arms were then docked again.  Another 8 mm port was placed at the left anterior axillary line at the level of the umbilicus.  11 mm assistant port was placed between the right midclavicular and right anterior axillary line port below the level of the umbilicus.  The gastrocolic omentum was completely taken down all the way up to the 2nd part of the duodenum.  Extended Kocher maneuver was accomplished.  Dissection was done between the lower border of the uncinate process of the pancreas towards the superior mesenteric vein.  There was a tumor that was seen clearly arising from the uncinate process of the pancreas inferiorly.  This was carefully taken down using LigaSure.  The the tumor which was found more towards the left side between the inferior body of the pancreas and the 3rd part of the duodenum was also carefully dissected and shelled out.  Following this stools stay sutures were placed on either side of duodenum at the point of tattooed.  The whole 2nd and  3rd portion of the duodenum was found to be tattooed with blue color with spillage into the adjoining area.  I opened the duodenum using scissors between the junction of the 2nd and 3rd portion of the duodenum.  I could not see any tumor or appreciate the anatomy inside has the whole area was marched with blue color.    I decided to undock now.  The robotic arms were then removed and the robot was undocked.  A right paramedian incision was made exactly closest to the area where the duodenum could be accessed.  The anterior fascia and posterior fascia was incised separately taking care of the rectus muscles.  A wound protector was placed.  The duodenum was already mobilized therefore I palpated the duodenum on the inside of the duodenum at the junction of 2nd and 3rd portion there was a large duodenal tumor about 1.2 x 1 cm.  Carefully this tumor was completely resected.  The duodenum was then Pro closed transversely in 2 layers using 4 0 PDS stitch and a patch of omentum was placed over the closure.  Ever seal was sprayed over the a duodenal closure.  Also following the removal of the duodenal tumor I palpated the whole area to feel for any lymph nodes was no gross evidence of any lymphadenopathy.  A liver ultrasound was done which did not show any intrahepatic lesion.  The NG tube was advanced beyond the 3rd portion of the duodenum.  Ever seal was sprayed over the pancreatic bed as well as the duodenotomy closed area.  The whole abdomen was irrigated with lots of antibiotic solution and was completely drained.  The instrument sponge counts were correct.  The abdominal wall was closed in 2 layers using 1.  PDS stitch.  The KUB did not show any evidence of foreign body skin was closed using 4 O Monocryl and Dermabond applied over the skin incision.  Patient was stable she was extubated taken to ICU in stable condition with no complication      Complications: no  Estimated Blood Loss (EBL): 350 mL           Implants:  none  Specimens:   1, duodenal tumor  Pancreatic tumor  Felice metastatic disease  Appendix  Right ovarian cyst            Condition: Stable    Disposition: ICU - extubated and stable.    Attestation: I was present and scrubbed for the entire procedure.     Operation Form for [employer]    Name __ _Kristan Goncalves                    Date of Birth __1975 _____    Patient Admission Date to hospital:   5/29/2020    Surgeon(s):  Ericka Zaragoza MD                     Other ___________           Type of Operation (check all that apply)     Procedure(s):  DUODENECTOMY ROBOTIC  OOPHORECTOMY  ROBOTIC APPENDECTOMY  REMOVAL cyst  LAPAROTOMY, EXPLORATORY, DUODENAL TUMOR RESECTION, PANCREATIC TUMOR RESECTION, LIVER ULTRASOUND     Gastric Resection                   x Small bowel resection                  Colon resection    Hepatic resection                    x Pancreatic resection                    Whipple                  x Lymph node resection             Cholecystectomy                          Adrenalectomy       Lung resection                          Mesenteric dissection                   Nephrectomy         Thyroidectomy                         RFA                                             Peritoneal stripping    Explorative                              x Lysis of Adhesions              Diaphramatic Resection   Other  _____________________________________        Type of vascular encasements (check all that apply)      SMA        Renal        Aorta/Cava     Iliac       Kerri  Kerri Hepatis    Celiac      Was tumor collected?           Yes                 No          If yes, tumor form must be completed by Data staff.                   Neoprobe Used          Yes          x No    Was it helpful in finding the tumor?      Yes        No     Operative Findings   Vascular Encasement                                       Mesenteric Extension     Bowel Obstruction - complete or partial    Bowel  Ischemia                                                 Carcinomatosis     Location of Primary Tumor     Duodenem, pancreas                                Primary Resected     xYes      No   How many primary sites? One in duodenem and one in pancreas        % of Tumor Debulked 100%                   %of Mesenteric Tumor Debulked_n/a_______  Was sentinel lymph node done?      Yes       No    Number of Cygnet LN Sent ______         Number of Cygnet LN Positive   __        Evidence of lymph obstruction:    Yes      No    Mets to other organs:       Yes         x No                                            If yes, Nodes and Metastases form must be completed  Number of tumors found: _2 primary, one lymph node_____________   Was tumor left behind? _no_____________   How much small bowel removed (cm)?  none_________    Seprafilm used:    Yes      x No  Visible ovarian masses:             Yes       x No   Visible retroperitoneal nodes:   Yes        x No    Preop Sandostatin used:          x Yes        No    Intraop chemotherapy used:      Yes        x No      Gallstones present :   Yes          No    x N/A  Visible liver mets:       Yes        x No      Blood transfusion needed:    Yes        x No  Complications_____none____________________________________    Nodes & Metastases Form    Nodes    Resected   Mets  Tissue Resected?      Solid organ/soft     Cervical   Yes No                      Liver   Yes No     Supraclavicular  Yes No   Bone   Yes No     Hilar           Yes No Brain   Yes No        Mediastinal  Yes No  Lung    Yes No     Auxiliary     Yes No Colon   Yes No     Mesenteric Yes No Pancreas  Yes No      Periportal Yes No xAppendix  xYes No  x   Retroperitoneal xYes No  Thyroid     Yes No     Celiac Yes No Chest wall  Yes No     Inguinal Yes No  Kidney       Yes   No     Iliac Yes No Breast       Yes   No     Other Yes No xOvary        xYes No  Right ovary  ___onee metastatic lymph node between pancreas  and duodenem________________ Pelvic Floor   Yes   No   Uterus         Yes No   Ureter         Yes   No   Seminal Vesicle YesNo      Spleen     Yes No   L Diaphram   Yes No   R Diaphram   Yes No    Other             Yes No   ____________________    Radiofrequency Ablation Form      Mode: Site:      Open    Liver- R Lobe                        Percutaneous   Liver- L Lobe     Laproscopic    Kidney        Bone        Pelvis         Other  ____________________                                      #of Lesions      Tumor Sizes  ________________ _______________  ________________ _______________  ________________ _______________  ________________ _______________    Complications?   YES  NO  UNKNOWN    Complications____________________________________________________________________________________________________________________________________      Jojo Knife Form      Indication: ________________________________________________________________________________________________________________   LIVER   R lobe size   L lobe size          Hepatic tera tumor sizes  _______       _______      _______   _______      Left tumor size_______      _______       _______      _______      _______      Right tumor size_______       _______       _______      _______  _______      Middle tumor size ______     Single probe                  2 Probe           3 Probes            4 Probes   Overlaping ablation       1        2         3        4     HILUM   Central duct-- tumor sizes ________     ________     ________   R duct--     tumor sizes _______       ________      ________   L duct--  tumor sizes _______      ________       ________       MESENTERIC ROOT NODES--tumor sizes _____________      URETER-- tumor sizes _____________     PERIAORTIC-- tumor sizes _______     ________    ________     PELVIC TUMOR  Obturator node         tumor sizes ________     ________     ________   Pres  tumor sizes ________     ________     _______    Other  tumor size _____________      PANCREAS--  tumor size ______                              Head     tumor size _______                            Body      tumor size _______                                                         Tail        tumor size _______     KIDNEY-- tumor size ________   Right        Left       OTHER SITES   _________________        size_____      _________________         size_____     _____ ____________        size_____    Ablation time___________        Complications?        Yes      No   Unknown    Complications/Comments:__________________________________________________________________________________________________________________________

## 2020-05-29 NOTE — INTERVAL H&P NOTE
The patient has been examined and the H&P has been reviewed:    I concur with the findings and no changes have occurred since H&P was written.    Anesthesia/Surgery risks, benefits and alternative options discussed and understood by patient/family.          Active Hospital Problems    Diagnosis  POA    Carcinoid tumor of stomach [D3A.092]  Yes      Resolved Hospital Problems   No resolved problems to display.

## 2020-05-29 NOTE — ANESTHESIA PROCEDURE NOTES
Arterial    Diagnosis: malignant carcinoid tumor of the stomach    Patient location during procedure: done in OR  Procedure start time: 5/29/2020 9:20 AM  Timeout: 5/29/2020 9:20 AM  Procedure end time: 5/29/2020 9:24 AM    Staffing  Authorizing Provider: Bo Allan MD  Performing Provider: Sridevi Loja CRNA    Anesthesiologist was present at the time of the procedure.    Preanesthetic Checklist  Completed: patient identified, site marked, surgical consent, pre-op evaluation, timeout performed, IV checked, risks and benefits discussed, monitors and equipment checked and anesthesia consent givenArterial  Skin Prep: chlorhexidine gluconate  Local Infiltration: lidocaine  Orientation: left  Location: radial  Catheter Size: 20 G  Catheter placement by Anatomical landmarks. Heme positive aspiration all ports.Insertion Attempts: 1  Assessment  Dressing: secured with tape and tegaderm  Patient: Tolerated well  Additional Notes  A-line inserted by Dr. Allan.

## 2020-05-30 LAB
ALBUMIN SERPL BCP-MCNC: 3.6 G/DL (ref 3.5–5.2)
ALP SERPL-CCNC: 89 U/L (ref 55–135)
ALT SERPL W/O P-5'-P-CCNC: 36 U/L (ref 10–44)
ANION GAP SERPL CALC-SCNC: 7 MMOL/L (ref 8–16)
AST SERPL-CCNC: 44 U/L (ref 10–40)
BASOPHILS # BLD AUTO: 0.01 K/UL (ref 0–0.2)
BASOPHILS NFR BLD: 0.1 % (ref 0–1.9)
BILIRUB SERPL-MCNC: 0.3 MG/DL (ref 0.1–1)
BUN SERPL-MCNC: 10 MG/DL (ref 6–20)
CALCIUM SERPL-MCNC: 7.6 MG/DL (ref 8.7–10.5)
CHLORIDE SERPL-SCNC: 115 MMOL/L (ref 95–110)
CO2 SERPL-SCNC: 16 MMOL/L (ref 23–29)
COMMENT: ABNORMAL
CREAT SERPL-MCNC: 0.8 MG/DL (ref 0.5–1.4)
DIFFERENTIAL METHOD: ABNORMAL
EOSINOPHIL # BLD AUTO: 0 K/UL (ref 0–0.5)
EOSINOPHIL NFR BLD: 0 % (ref 0–8)
ERYTHROCYTE [DISTWIDTH] IN BLOOD BY AUTOMATED COUNT: 13.4 % (ref 11.5–14.5)
EST. GFR  (AFRICAN AMERICAN): >60 ML/MIN/1.73 M^2
EST. GFR  (NON AFRICAN AMERICAN): >60 ML/MIN/1.73 M^2
FINAL PATHOLOGIC DIAGNOSIS: ABNORMAL
GLUCOSE SERPL-MCNC: 129 MG/DL (ref 70–110)
GROSS: ABNORMAL
HCT VFR BLD AUTO: 39.2 % (ref 37–48.5)
HGB BLD-MCNC: 12.3 G/DL (ref 12–16)
IMM GRANULOCYTES # BLD AUTO: 0.07 K/UL (ref 0–0.04)
IMM GRANULOCYTES NFR BLD AUTO: 0.7 % (ref 0–0.5)
LYMPHOCYTES # BLD AUTO: 1 K/UL (ref 1–4.8)
LYMPHOCYTES NFR BLD: 9.8 % (ref 18–48)
MAGNESIUM SERPL-MCNC: 2.2 MG/DL (ref 1.6–2.6)
MCH RBC QN AUTO: 26.9 PG (ref 27–31)
MCHC RBC AUTO-ENTMCNC: 31.4 G/DL (ref 32–36)
MCV RBC AUTO: 86 FL (ref 82–98)
MONOCYTES # BLD AUTO: 0.8 K/UL (ref 0.3–1)
MONOCYTES NFR BLD: 7.5 % (ref 4–15)
NEUTROPHILS # BLD AUTO: 8.7 K/UL (ref 1.8–7.7)
NEUTROPHILS NFR BLD: 81.9 % (ref 38–73)
NRBC BLD-RTO: 0 /100 WBC
PHOSPHATE SERPL-MCNC: 2.7 MG/DL (ref 2.7–4.5)
PLATELET # BLD AUTO: 152 K/UL (ref 150–350)
PLATELET BLD QL SMEAR: ABNORMAL
PMV BLD AUTO: 10.7 FL (ref 9.2–12.9)
POTASSIUM SERPL-SCNC: 4.3 MMOL/L (ref 3.5–5.1)
PROT SERPL-MCNC: 6.7 G/DL (ref 6–8.4)
RBC # BLD AUTO: 4.57 M/UL (ref 4–5.4)
SODIUM SERPL-SCNC: 138 MMOL/L (ref 136–145)
WBC # BLD AUTO: 10.57 K/UL (ref 3.9–12.7)

## 2020-05-30 PROCEDURE — 63600175 PHARM REV CODE 636 W HCPCS: Performed by: FAMILY MEDICINE

## 2020-05-30 PROCEDURE — 85025 COMPLETE CBC W/AUTO DIFF WBC: CPT

## 2020-05-30 PROCEDURE — 80053 COMPREHEN METABOLIC PANEL: CPT

## 2020-05-30 PROCEDURE — 83735 ASSAY OF MAGNESIUM: CPT

## 2020-05-30 PROCEDURE — 84100 ASSAY OF PHOSPHORUS: CPT

## 2020-05-30 PROCEDURE — 25000003 PHARM REV CODE 250: Performed by: SURGERY

## 2020-05-30 PROCEDURE — 94799 UNLISTED PULMONARY SVC/PX: CPT

## 2020-05-30 PROCEDURE — 63600175 PHARM REV CODE 636 W HCPCS: Performed by: SURGERY

## 2020-05-30 PROCEDURE — 94761 N-INVAS EAR/PLS OXIMETRY MLT: CPT

## 2020-05-30 PROCEDURE — 11000001 HC ACUTE MED/SURG PRIVATE ROOM

## 2020-05-30 PROCEDURE — 99900035 HC TECH TIME PER 15 MIN (STAT)

## 2020-05-30 PROCEDURE — 94770 HC EXHALED C02 TEST: CPT

## 2020-05-30 PROCEDURE — 25000003 PHARM REV CODE 250: Performed by: FAMILY MEDICINE

## 2020-05-30 PROCEDURE — 97116 GAIT TRAINING THERAPY: CPT

## 2020-05-30 PROCEDURE — 97165 OT EVAL LOW COMPLEX 30 MIN: CPT

## 2020-05-30 PROCEDURE — 97161 PT EVAL LOW COMPLEX 20 MIN: CPT

## 2020-05-30 RX ORDER — SODIUM CHLORIDE 9 MG/ML
INJECTION, SOLUTION INTRAVENOUS CONTINUOUS
Status: DISCONTINUED | OUTPATIENT
Start: 2020-05-30 | End: 2020-05-30

## 2020-05-30 RX ORDER — HYDROMORPHONE HYDROCHLORIDE 2 MG/ML
0.5 INJECTION, SOLUTION INTRAMUSCULAR; INTRAVENOUS; SUBCUTANEOUS EVERY 5 MIN PRN
Status: DISCONTINUED | OUTPATIENT
Start: 2020-05-30 | End: 2020-05-30

## 2020-05-30 RX ORDER — ONDANSETRON 2 MG/ML
4 INJECTION INTRAMUSCULAR; INTRAVENOUS DAILY PRN
Status: DISCONTINUED | OUTPATIENT
Start: 2020-05-30 | End: 2020-05-30

## 2020-05-30 RX ORDER — SODIUM CHLORIDE 0.9 % (FLUSH) 0.9 %
3 SYRINGE (ML) INJECTION
Status: DISCONTINUED | OUTPATIENT
Start: 2020-05-30 | End: 2020-05-30

## 2020-05-30 RX ORDER — ENOXAPARIN SODIUM 100 MG/ML
40 INJECTION SUBCUTANEOUS EVERY 24 HOURS
Status: DISCONTINUED | OUTPATIENT
Start: 2020-05-30 | End: 2020-06-05 | Stop reason: HOSPADM

## 2020-05-30 RX ADMIN — PREGABALIN 75 MG: 75 CAPSULE ORAL at 09:05

## 2020-05-30 RX ADMIN — TOPIRAMATE 200 MG: 100 TABLET, FILM COATED ORAL at 09:05

## 2020-05-30 RX ADMIN — KETOROLAC TROMETHAMINE 10 MG: 30 INJECTION, SOLUTION INTRAMUSCULAR at 11:05

## 2020-05-30 RX ADMIN — PHENYTOIN SODIUM 100 MG: 100 CAPSULE, EXTENDED RELEASE ORAL at 09:05

## 2020-05-30 RX ADMIN — KETOROLAC TROMETHAMINE 10 MG: 30 INJECTION, SOLUTION INTRAMUSCULAR at 05:05

## 2020-05-30 RX ADMIN — ENOXAPARIN SODIUM 40 MG: 100 INJECTION SUBCUTANEOUS at 05:05

## 2020-05-30 RX ADMIN — DEXTROSE MONOHYDRATE, SODIUM CHLORIDE, AND POTASSIUM CHLORIDE: 50; 4.5; 1.49 INJECTION, SOLUTION INTRAVENOUS at 03:05

## 2020-05-30 RX ADMIN — IRON SUCROSE 100 MG: 20 INJECTION, SOLUTION INTRAVENOUS at 09:05

## 2020-05-30 RX ADMIN — AMPICILLIN SODIUM AND SULBACTAM SODIUM 3 G: 2; 1 INJECTION, POWDER, FOR SOLUTION INTRAMUSCULAR; INTRAVENOUS at 01:05

## 2020-05-30 RX ADMIN — PHENYTOIN SODIUM 100 MG: 100 CAPSULE, EXTENDED RELEASE ORAL at 03:05

## 2020-05-30 RX ADMIN — AMPICILLIN SODIUM AND SULBACTAM SODIUM 3 G: 2; 1 INJECTION, POWDER, FOR SOLUTION INTRAMUSCULAR; INTRAVENOUS at 06:05

## 2020-05-30 RX ADMIN — OCTREOTIDE ACETATE 125 MCG/HR: 1000 INJECTION, SOLUTION INTRAVENOUS; SUBCUTANEOUS at 09:05

## 2020-05-30 RX ADMIN — KETOROLAC TROMETHAMINE 10 MG: 30 INJECTION, SOLUTION INTRAMUSCULAR at 06:05

## 2020-05-30 NOTE — PT/OT/SLP EVAL
Physical Therapy Evaluation and Treatment    Patient Name:  Kristan Goncalves   MRN:  [id number]    Recommendations:     Discharge Recommendations:  home   Discharge Equipment Recommendations: shower chair   Barriers to discharge: None    Assessment:     Kristan Goncalves is a 44 y.o. female admitted with a medical diagnosis of The encounter diagnosis was Carcinoid tumor of stomach.  She presents with the following impairments/functional limitations:  weakness, impaired endurance, impaired self care skills, impaired functional mobilty, gait instability, impaired balance, pain, decreased ROM, impaired skin. Pt ambulated 10 ft with no AD and CGA. Likely home with no therapy needs upon d/c, recommending shower chair.    Rehab Prognosis: Good; patient would benefit from acute skilled PT services to address these deficits and reach maximum level of function.    Recent Surgery: Procedure(s) (LRB):  DUODENECTOMY ROBOTIC (N/A)  OOPHORECTOMY (Right)  ROBOTIC APPENDECTOMY (N/A)  REMOVAL cyst (Right)  LAPAROTOMY, EXPLORATORY, DUODENAL TUMOR RESECTION, PANCREATIC TUMOR RESECTION, LIVER ULTRASOUND 1 Day Post-Op    Plan:     During this hospitalization, patient to be seen 5 x/week to address the identified rehab impairments via gait training, therapeutic activities, therapeutic exercises and progress toward the following goals:    · Plan of Care Expires:  06/30/20    Subjective     Chief Complaint: none  Patient/Family Comments/goals: pt reporting increase in pain in abdomen with ambulation  Pain/Comfort:  · Pain Rating 1: 3/10  · Location 1: abdomen  · Pain Addressed 1: Pre-medicate for activity, Reposition, Cessation of Activity, Distraction  · Pain Rating Post-Intervention 1: 3/10    Patients cultural, spiritual, Pentec[employer]alism conflicts given the current situation: no    Living Environment:  Pt lives with her daughter and son (21 and 24 years old) in a mobile home with 5 FRANSISCO with B rails and tub/shower combo.  Prior to  admission, patients level of function was independent without AD for mobility and ADLs, drives.  Equipment used at home: none.  DME owned (not currently used): none.  Upon discharge, patient will have assistance from her family.    Objective:     Communicated with nurse Zazueta prior to session.  Patient found up in chair with arterial line, blood pressure cuff, hatch catheter, NG tube, PCA, telemetry, peripheral IV, pulse ox (continuous)  upon PT entry to room.    General Precautions: Standard, fall   Orthopedic Precautions:N/A   Braces: N/A     Exams:  · Gross Motor Coordination:  WFL  · Postural Exam:  Patient presented with the following abnormalities:    · -       Rounded shoulders  · Sensation:    · -       Intact  · Skin Integrity/Edema:      · -       Skin integrity: surgical incision abdomen with drain  · RLE ROM: WFL  · RLE Strength: WFL  · LLE ROM: WFL  · LLE Strength: WFL    Functional Mobility:  · Transfers:     · Sit to Stand:  contact guard assistance with no AD  · Gait: 10 ft forward/backward with no AD and CGA - initially ambulating with decreased step length/foot clearance but improves - cues for improved upright posture. Further ambulation limited by increase in abdominal pain to 6/10.      Therapeutic Activities and Exercises:  Pt up in chair when PT/OT entered.  Ambulated as reported above- further gait distance limited by pt's reports of increased abdominal pain to 6/10 and requesting to rest.  Educated in BUE/BLE exercises, pursed lip breathing with lateral and posterior expansion, and improved upright posture.    AM-PAC 6 CLICK MOBILITY  Total Score:16     Patient left up in chair with all lines intact, call button in reach and nurse notified.    GOALS:   Multidisciplinary Problems     Physical Therapy Goals        Problem: Physical Therapy Goal    Goal Priority Disciplines Outcome Goal Variances Interventions   Physical Therapy Goal     PT, PT/OT Ongoing, Progressing     Description:  Goals to  be met by: 20     Patient will increase functional independence with mobility by performin. Supine <> sit with Stand-by Assistance  2. Sit to stand transfer with Supervision  3. Bed to chair transfer with Supervision    4. Gait  x 150 feet with Supervision  5. Lower extremity exercise program x 10 reps per handout, with supervision                      History:     Past Medical History:   Diagnosis Date    Elevated gastrin level     Seizures     Sleep apnea     Zollinger-Lambert syndrome        Past Surgical History:   Procedure Laterality Date    CHOLECYSTECTOMY      ENDOSCOPIC ULTRASOUND OF UPPER GASTROINTESTINAL TRACT N/A 2020    Procedure: ULTRASOUND-ENDOSCOPIC-UPPER;  Surgeon: Marcelo Menjivar MD;  Location: [facility] ENDO;  Service: Endoscopy;  Laterality: N/A;  Needs Rapid COVID    ESOPHAGOGASTRODUODENOSCOPY N/A 2020    Procedure: EGD (ESOPHAGOGASTRODUODENOSCOPY);  Surgeon: Marcelo Menjivar MD;  Location: [facility] ENDO;  Service: Endoscopy;  Laterality: N/A;    HYSTERECTOMY         Time Tracking:     PT Received On: 20  PT Start Time: 851     PT Stop Time: 914  PT Total Time (min): 23 min with OT    Billable Minutes: Evaluation 15 and Gait Training 8      Ronna Weller, PT  2020

## 2020-05-30 NOTE — PROGRESS NOTES
"Surgery Progress Note  05/30/2020      Interval HPI  ROB following surgery yest  Did well overnight  Throat sore but abdominal pain okay, using PCA as needed  Up in chair with nursing this AM  No flatus yet  UOP 1.7 L    ss  AF, VSS    Objective  VITAL SIGNS: 24 HR MIN & MAX LAST    Temp  Min: 98.7 °F (37.1 °C)  Max: 99 °F (37.2 °C)  98.7 °F (37.1 °C)        BP  Min: 119/70  Max: 142/74  (!) 140/80     Pulse  Min: 66  Max: 94  70     Resp  Min: 13  Max: 28  15    SpO2  Min: 96 %  Max: 100 %  99 %      HT: 5' 5" (165.1 cm)  WT: 104 kg (229 lb 4.5 oz)  BMI: 38.2     Physical Exam:  Gen: No acute distress  Neuro: Aox3  HEENT: NCAT, EOMI   CV: Regular rate, extremities well perfused   Resp: Normal WOB  Abd: Soft, nondistended, appropriately TTP  MSK: No pedal edema    Results  Recent Labs   Lab 05/25/20  1320 05/29/20  1703 05/30/20  0426   WBC 5.67  5.67 13.76* 10.57   HGB 13.0  13.0 12.3 12.3   HCT 42.7  42.7 39.6 39.2     231 188 152     139 138 138   CO2 22*  22* 17* 16*   BUN 7  7 12 10   CREATININE 0.8  0.8 1.0 0.8   CALCIUM 9.0  9.0 7.9* 7.6*   MG  --   --  2.2   PHOS  --   --  2.7   ALKPHOS 98  98  --  89       Asessment/Plan  44 y.o. female with ZES s/p right salpingo oophorectomy, appendectomy, pancreatic and duodenal tumor resection on 5/29    Neuro: cont PCA and multimodal pain control  Resp: promote IS use, OOB to chair, end tidal CO2 while on PCA  CV: no issues, pressure well controlled. Discontinue a-line.  FEN: mIVF, replace lytes PRN, NPO   GI: continue trans-anastomotic NGT to Stefanie PATRICIAo at 250- will half today  Renal: d/c Patel, trend Cr and f/u UOP  Heme: Hgb stable post op   ID: no issues, cont perioperative Unasyn x3 doses  MSK: PT today     Ppx: Juanjose Blanchard MD  [employer] General Surgery PGY-2    "

## 2020-05-30 NOTE — PLAN OF CARE
Problem: Occupational Therapy Goal  Goal: Occupational Therapy Goal  Description  Goals to be met by: 6/30/2020    Patient will increase functional independence with ADLs by performing:    UE Dressing with Modified [place].  LE Dressing with Modified [place]/Supervision.  Grooming while standing at sink with Modified [place].  Toileting from toilet with Modified [place] for hygiene and clothing management.   Toilet transfer to toilet with Modified [place].     Outcome: Ongoing, Progressing     Initial OT eval perfomed on this date, report to follow. Pt with excellent participation with therapy. Pain 3/10 at rest, elevated to 6/10 with brief ambulation, but decreased to 3/10 once resting. D/c disposition: likely home with no needs and family care, may benefit from shower chair or TTB. Pt very receptive to all education from OT/PT. Pt would benefit from continued skilled OT/PT to advance her functional mobility and independence with ADLs.

## 2020-05-30 NOTE — PLAN OF CARE
VN cued into pt's room for introduction with pt's permission.  VN role explained and informed pt that VN would be working with bedside nurse and the rest of the care team.  Fall risk and bed alarm protocol education provided.  Instructed pt to call for assistance and agreeable.  Allowed time for questions. NAD noted. No c/o.  Will cont to be available as needed.

## 2020-05-30 NOTE — NURSING
No acute changes. A & O throughout night, follows commands. Remains on jose and maintenance IVF. Minimal c/o pain. Pain well controlled with PCA and Toradol. VSS. Afebrile. Abdominal incisions CDI. UO adequate throughout shift. CAN drain output minimal, 5 cc/hr. NGT to LIS, minimal output. Safety precautions in place. Protective environment maintained. Pt resting comfortably. Will cont to monitor and report off to oncoming nurse to assume care.

## 2020-05-30 NOTE — PT/OT/SLP EVAL
Occupational Therapy   Evaluation    Name: Kristan Goncalves  MRN: [id number]  Admitting Diagnosis:  <principal problem not specified> 1 Day Post-Op    Recommendations:     Discharge Recommendations: home(likely home with no needs, TBD pending pt progress)  Discharge Equipment Recommendations:  other (see comments)(possibly shower chair or TTB)  Barriers to discharge:  None    Assessment:     Kristan Goncalves is a 44 y.o. female with a medical diagnosis of carcinoid tumor of stomach, 1-day post-op.  She presents with the following performance deficits affecting function: weakness, impaired functional mobilty, impaired cardiopulmonary response to activity, impaired endurance, gait instability, pain, impaired self care skills, impaired skin, impaired balance, decreased coordination.      Pt with excellent participation with OT/PT. Pt reports pain 3/10 at rest, elevated to 6/10 with movement, but decreased to 3/10 while at rest in bedside chair. Pt very receptive to all education from therapists.     Rehab Prognosis: Good; patient would benefit from acute skilled OT services to address these deficits and reach maximum level of function.       Plan:     Patient to be seen 5 x/week to address the above listed problems via self-care/home management, therapeutic activities, therapeutic exercises  · Plan of Care Expires: 06/30/20  · Plan of Care Reviewed with: patient    Subjective     Chief Complaint: abdominal pain with movement  Patient/Family Comments/goals: return home to family     Occupational Profile:  Living Environment: Pt lives in [place] in a mobile home with 5 FRANSISCO and bilateral hand rails, t/s combo. Pt lives with her two adult children.   Previous level of function: Independent with all ADL/IADLs, ambulates without AD  Roles and Routines: Drives, not working  Equipment Used at Home:  none  Assistance upon Discharge: family     Pain/Comfort:  · Pain Rating 1: 3/10  · Location - Orientation 1:  generalized  · Location 1: abdomen  · Pain Addressed 1: Pre-medicate for activity, Reposition, Cessation of Activity  · Pain Rating Post-Intervention 1: 3/10    Patients cultural, spiritual, Caodaism conflicts given the current situation: no    Objective:     Communicated with: Nurse prior to session.  Patient found up in chair with arterial line, blood pressure cuff, hatch catheter, NG tube, PCA, telemetry, peripheral IV, pulse ox (continuous)(ICU monitoring) upon OT entry to room.    General Precautions: Standard, fall   Orthopedic Precautions:N/A   Braces: N/A     Occupational Performance:    Functional Mobility/Transfers:  · Patient completed Sit <> Stand Transfer with contact guard assistance  with  no assistive device   · Functional Mobility: Pt ambulated fwd/bwd ~10' with CGA and no AD, no LOB. Pt with decreased step length.     Cognitive/Visual Perceptual:  Cognitive/Psychosocial Skills:     -       Oriented to: Person, Place, Time and Situation   -       Follows Commands/attention:Follows multistep  commands  -       Communication: clear/fluent  -       Safety awareness/insight to disability: intact   -       Mood/Affect/Coping skills/emotional control: Appropriate to situation, Cooperative and Pleasant    Physical Exam:  Balance:    -       sitting balance good, standing balance fair +  Sensation:    -       Intact  Upper Extremity Range of Motion:     -       Right Upper Extremity: WFL  -       Left Upper Extremity: WFL  Upper Extremity Strength:    -       Right Upper Extremity: WFL  -       Left Upper Extremity: WFL   Strength:    -       Right Upper Extremity: WFL  -       Left Upper Extremity: WFL  Fine Motor Coordination:    -       Intact  Gross motor coordination:   WFL    AMPAC 6 Click ADL:  AMPAC Total Score: 12    Treatment & Education:  Pt educated on role of OT/POC. Pt performed skills as above. Pt educated on post-surgical scar mgt, abdominal bracing, lateral rib expansion breathing  technique, PLB, and UE/LE therex to perform from bed- or chair-level. Pt very receptive to all education and demonstrated good understanding and motivation. Patient will benefit from continued skilled OT to address deficits and improve performance in functional ADL tasks. Cont OT per POC.  Education:    Patient left up in chair with all lines intact, call button in reach and nurse notified    GOALS:   Multidisciplinary Problems     Occupational Therapy Goals        Problem: Occupational Therapy Goal    Goal Priority Disciplines Outcome Interventions   Occupational Therapy Goal     OT, PT/OT Ongoing, Progressing    Description:  Goals to be met by: 6/30/2020    Patient will increase functional independence with ADLs by performing:    UE Dressing with Modified [place].  LE Dressing with Modified [place]/Supervision.  Grooming while standing at sink with Modified [place].  Toileting from toilet with Modified [place] for hygiene and clothing management.   Toilet transfer to toilet with Modified [place].                      History:     Past Medical History:   Diagnosis Date    Elevated gastrin level     Seizures     Sleep apnea     Zollinger-Lambert syndrome        Past Surgical History:   Procedure Laterality Date    CHOLECYSTECTOMY      ENDOSCOPIC ULTRASOUND OF UPPER GASTROINTESTINAL TRACT N/A 5/25/2020    Procedure: ULTRASOUND-ENDOSCOPIC-UPPER;  Surgeon: Marcelo Menjivar MD;  Location: [facility];  Service: Endoscopy;  Laterality: N/A;  Needs Rapid COVID    ESOPHAGOGASTRODUODENOSCOPY N/A 5/25/2020    Procedure: EGD (ESOPHAGOGASTRODUODENOSCOPY);  Surgeon: Marcelo Menjivar MD;  Location: [facility];  Service: Endoscopy;  Laterality: N/A;    HYSTERECTOMY         Time Tracking:     OT Date of Treatment: 05/30/20  OT Start Time: 0851  OT Stop Time: 0914  OT Total Time (min): 23 min coeval with PT    Billable Minutes:Evaluation 23    Kellee Narvaez OT  5/30/2020

## 2020-05-30 NOTE — PLAN OF CARE
Received pt from ICU this PM. Pt is A+Ox4. IVF and IV jose infusing well to PIVs. Minimal complaints of pain, managed well with PCA in use and scheduled toradol. NGT remains in place to LIWS, 200mLs thick green output this PM. CAN drain remains intact, sanguinous drainage. Pt voided spontaneously x1 post hatch removal. Safety precautions maintained.

## 2020-05-30 NOTE — PLAN OF CARE
Problem: Physical Therapy Goal  Goal: Physical Therapy Goal  Description  Goals to be met by: 20     Patient will increase functional independence with mobility by performin. Supine <> sit with Stand-by Assistance  2. Sit to stand transfer with Supervision  3. Bed to chair transfer with Supervision    4. Gait  x 150 feet with Supervision  5. Lower extremity exercise program x 10 reps per handout, with supervision     Outcome: Ongoing, Progressing     PT evaluation completed, note to follow. Pt ambulated 10 ft with no AD and CGA. Likely home with no therapy needs upon d/c, recommending shower chair.

## 2020-05-30 NOTE — NURSING TRANSFER
Nursing Transfer Note      5/30/2020     Transfer To: 509 From: 256    Transfer via bed    Transfer with cardiac monitoring    Transported by JOVANNA Peguero    Medicines sent: yes    Chart send with patient: Yes    Notified: pt notified family    Patient reassessed at: 1200,5/30/2020    Upon arrival to floor: cardiac monitor applied, patient oriented to room and call bell in reach. Rn at bedside. All questions answered. Belongings sent with pt. Luggage at bedside with smartphone.

## 2020-05-30 NOTE — ANESTHESIA POSTPROCEDURE EVALUATION
Anesthesia Post Evaluation    Patient: Kristan Goncalves    Procedure(s) Performed: Procedure(s) (LRB):  DUODENECTOMY ROBOTIC (N/A)  OOPHORECTOMY (Right)  ROBOTIC APPENDECTOMY (N/A)  REMOVAL cyst (Right)  LAPAROTOMY, EXPLORATORY, DUODENAL TUMOR RESECTION, PANCREATIC TUMOR RESECTION, LIVER ULTRASOUND    Final Anesthesia Type: general    Patient location during evaluation: ICU  Patient participation: Yes- Able to Participate  Level of consciousness: awake and alert, oriented and awake  Post-procedure vital signs: reviewed and stable  Pain management: adequate  Airway patency: patent    PONV status at discharge: No PONV  Anesthetic complications: no      Cardiovascular status: blood pressure returned to baseline  Respiratory status: unassisted and room air  Hydration status: euvolemic  Follow-up not needed.          Vitals Value Taken Time   /79 5/30/2020  9:44 AM   Temp 37.1 °C (98.7 °F) 5/30/2020  3:30 AM   Pulse 93 5/30/2020  9:53 AM   Resp 21 5/30/2020  9:53 AM   SpO2 97 % 5/30/2020  9:53 AM   Vitals shown include unvalidated device data.      No case tracking events are documented in the log.      Pain/Maia Score: Pain Rating Prior to Med Admin: 5 (5/30/2020  6:36 AM)

## 2020-05-31 LAB
ALBUMIN SERPL BCP-MCNC: 3.4 G/DL (ref 3.5–5.2)
ALP SERPL-CCNC: 80 U/L (ref 55–135)
ALT SERPL W/O P-5'-P-CCNC: 29 U/L (ref 10–44)
ANION GAP SERPL CALC-SCNC: 7 MMOL/L (ref 8–16)
AST SERPL-CCNC: 29 U/L (ref 10–40)
BASOPHILS # BLD AUTO: 0.02 K/UL (ref 0–0.2)
BASOPHILS NFR BLD: 0.2 % (ref 0–1.9)
BILIRUB SERPL-MCNC: 0.3 MG/DL (ref 0.1–1)
BUN SERPL-MCNC: 12 MG/DL (ref 6–20)
CALCIUM SERPL-MCNC: 7.9 MG/DL (ref 8.7–10.5)
CHLORIDE SERPL-SCNC: 110 MMOL/L (ref 95–110)
CO2 SERPL-SCNC: 19 MMOL/L (ref 23–29)
CREAT SERPL-MCNC: 0.8 MG/DL (ref 0.5–1.4)
DIFFERENTIAL METHOD: ABNORMAL
EOSINOPHIL # BLD AUTO: 0.2 K/UL (ref 0–0.5)
EOSINOPHIL NFR BLD: 2.6 % (ref 0–8)
ERYTHROCYTE [DISTWIDTH] IN BLOOD BY AUTOMATED COUNT: 13.4 % (ref 11.5–14.5)
EST. GFR  (AFRICAN AMERICAN): >60 ML/MIN/1.73 M^2
EST. GFR  (NON AFRICAN AMERICAN): >60 ML/MIN/1.73 M^2
GLUCOSE SERPL-MCNC: 107 MG/DL (ref 70–110)
HCT VFR BLD AUTO: 35.6 % (ref 37–48.5)
HGB BLD-MCNC: 10.9 G/DL (ref 12–16)
IMM GRANULOCYTES # BLD AUTO: 0.04 K/UL (ref 0–0.04)
IMM GRANULOCYTES NFR BLD AUTO: 0.4 % (ref 0–0.5)
LYMPHOCYTES # BLD AUTO: 1.6 K/UL (ref 1–4.8)
LYMPHOCYTES NFR BLD: 18 % (ref 18–48)
MAGNESIUM SERPL-MCNC: 2 MG/DL (ref 1.6–2.6)
MCH RBC QN AUTO: 26.8 PG (ref 27–31)
MCHC RBC AUTO-ENTMCNC: 30.6 G/DL (ref 32–36)
MCV RBC AUTO: 88 FL (ref 82–98)
MONOCYTES # BLD AUTO: 0.7 K/UL (ref 0.3–1)
MONOCYTES NFR BLD: 7.4 % (ref 4–15)
NEUTROPHILS # BLD AUTO: 6.5 K/UL (ref 1.8–7.7)
NEUTROPHILS NFR BLD: 71.4 % (ref 38–73)
NRBC BLD-RTO: 0 /100 WBC
PHOSPHATE SERPL-MCNC: 2.1 MG/DL (ref 2.7–4.5)
PLATELET # BLD AUTO: 178 K/UL (ref 150–350)
PMV BLD AUTO: 11 FL (ref 9.2–12.9)
POTASSIUM SERPL-SCNC: 3.8 MMOL/L (ref 3.5–5.1)
PROT SERPL-MCNC: 6.5 G/DL (ref 6–8.4)
RBC # BLD AUTO: 4.06 M/UL (ref 4–5.4)
SODIUM SERPL-SCNC: 136 MMOL/L (ref 136–145)
WBC # BLD AUTO: 9.13 K/UL (ref 3.9–12.7)

## 2020-05-31 PROCEDURE — 25000003 PHARM REV CODE 250: Performed by: FAMILY MEDICINE

## 2020-05-31 PROCEDURE — 80053 COMPREHEN METABOLIC PANEL: CPT

## 2020-05-31 PROCEDURE — 94761 N-INVAS EAR/PLS OXIMETRY MLT: CPT

## 2020-05-31 PROCEDURE — 36415 COLL VENOUS BLD VENIPUNCTURE: CPT

## 2020-05-31 PROCEDURE — 11000001 HC ACUTE MED/SURG PRIVATE ROOM

## 2020-05-31 PROCEDURE — 63600175 PHARM REV CODE 636 W HCPCS: Performed by: FAMILY MEDICINE

## 2020-05-31 PROCEDURE — 85025 COMPLETE CBC W/AUTO DIFF WBC: CPT

## 2020-05-31 PROCEDURE — 99900035 HC TECH TIME PER 15 MIN (STAT)

## 2020-05-31 PROCEDURE — 63600175 PHARM REV CODE 636 W HCPCS: Performed by: SURGERY

## 2020-05-31 PROCEDURE — 94770 HC EXHALED C02 TEST: CPT

## 2020-05-31 PROCEDURE — 94799 UNLISTED PULMONARY SVC/PX: CPT

## 2020-05-31 PROCEDURE — 83735 ASSAY OF MAGNESIUM: CPT

## 2020-05-31 PROCEDURE — 84100 ASSAY OF PHOSPHORUS: CPT

## 2020-05-31 PROCEDURE — 25000003 PHARM REV CODE 250: Performed by: SURGERY

## 2020-05-31 RX ORDER — ERGOCALCIFEROL 1.25 MG/1
50000 CAPSULE ORAL
Status: DISCONTINUED | OUTPATIENT
Start: 2020-05-31 | End: 2020-06-05 | Stop reason: HOSPADM

## 2020-05-31 RX ORDER — VITAMIN E MIXED 400 UNIT
400 CAPSULE ORAL DAILY
Status: DISCONTINUED | OUTPATIENT
Start: 2020-05-31 | End: 2020-06-05 | Stop reason: HOSPADM

## 2020-05-31 RX ORDER — ASCORBIC ACID 500 MG
1000 TABLET ORAL 2 TIMES DAILY
Status: DISCONTINUED | OUTPATIENT
Start: 2020-05-31 | End: 2020-06-05 | Stop reason: HOSPADM

## 2020-05-31 RX ORDER — ZINC SULFATE 50(220)MG
220 CAPSULE ORAL DAILY
Status: DISCONTINUED | OUTPATIENT
Start: 2020-05-31 | End: 2020-06-05 | Stop reason: HOSPADM

## 2020-05-31 RX ORDER — MORPHINE SULFATE 2 MG/ML
2 INJECTION, SOLUTION INTRAMUSCULAR; INTRAVENOUS EVERY 4 HOURS PRN
Status: DISCONTINUED | OUTPATIENT
Start: 2020-05-31 | End: 2020-06-05 | Stop reason: HOSPADM

## 2020-05-31 RX ORDER — URSODIOL 300 MG/1
300 CAPSULE ORAL 2 TIMES DAILY
Status: DISCONTINUED | OUTPATIENT
Start: 2020-05-31 | End: 2020-06-05 | Stop reason: HOSPADM

## 2020-05-31 RX ORDER — CYANOCOBALAMIN 1000 UG/ML
1000 INJECTION, SOLUTION INTRAMUSCULAR; SUBCUTANEOUS DAILY
Status: DISCONTINUED | OUTPATIENT
Start: 2020-05-31 | End: 2020-06-05 | Stop reason: HOSPADM

## 2020-05-31 RX ADMIN — CYANOCOBALAMIN 1000 MCG: 1000 INJECTION, SOLUTION INTRAMUSCULAR; SUBCUTANEOUS at 12:05

## 2020-05-31 RX ADMIN — DEXTROSE MONOHYDRATE, SODIUM CHLORIDE, AND POTASSIUM CHLORIDE: 50; 4.5; 1.49 INJECTION, SOLUTION INTRAVENOUS at 03:05

## 2020-05-31 RX ADMIN — OXYCODONE HYDROCHLORIDE AND ACETAMINOPHEN 1000 MG: 500 TABLET ORAL at 08:05

## 2020-05-31 RX ADMIN — DEXTROSE MONOHYDRATE, SODIUM CHLORIDE, AND POTASSIUM CHLORIDE: 50; 4.5; 1.49 INJECTION, SOLUTION INTRAVENOUS at 12:05

## 2020-05-31 RX ADMIN — PREGABALIN 75 MG: 75 CAPSULE ORAL at 08:05

## 2020-05-31 RX ADMIN — TOPIRAMATE 200 MG: 100 TABLET, FILM COATED ORAL at 08:05

## 2020-05-31 RX ADMIN — IRON SUCROSE 100 MG: 20 INJECTION, SOLUTION INTRAVENOUS at 09:05

## 2020-05-31 RX ADMIN — MORPHINE SULFATE 2 MG: 2 INJECTION, SOLUTION INTRAMUSCULAR; INTRAVENOUS at 08:05

## 2020-05-31 RX ADMIN — PHENYTOIN SODIUM 100 MG: 100 CAPSULE, EXTENDED RELEASE ORAL at 09:05

## 2020-05-31 RX ADMIN — URSODIOL 300 MG: 300 CAPSULE ORAL at 08:05

## 2020-05-31 RX ADMIN — POTASSIUM PHOSPHATE, MONOBASIC AND POTASSIUM PHOSPHATE, DIBASIC 20 MMOL: 224; 236 INJECTION, SOLUTION, CONCENTRATE INTRAVENOUS at 05:05

## 2020-05-31 RX ADMIN — OXYCODONE HYDROCHLORIDE AND ACETAMINOPHEN 1000 MG: 500 TABLET ORAL at 12:05

## 2020-05-31 RX ADMIN — PHENYTOIN SODIUM 100 MG: 100 CAPSULE, EXTENDED RELEASE ORAL at 08:05

## 2020-05-31 RX ADMIN — SODIUM PHOSPHATE, MONOBASIC, MONOHYDRATE 30 MMOL: 276; 142 INJECTION, SOLUTION INTRAVENOUS at 10:05

## 2020-05-31 RX ADMIN — ERGOCALCIFEROL 50000 UNITS: 1.25 CAPSULE ORAL at 12:05

## 2020-05-31 RX ADMIN — DEXTROSE MONOHYDRATE, SODIUM CHLORIDE, AND POTASSIUM CHLORIDE: 50; 4.5; 1.49 INJECTION, SOLUTION INTRAVENOUS at 09:05

## 2020-05-31 RX ADMIN — VITAMIN E CAP 400 UNIT 400 UNITS: 400 CAP at 12:05

## 2020-05-31 RX ADMIN — ZINC SULFATE 220 MG (50 MG) CAPSULE 220 MG: CAPSULE at 12:05

## 2020-05-31 RX ADMIN — PREGABALIN 75 MG: 75 CAPSULE ORAL at 09:05

## 2020-05-31 RX ADMIN — KETOROLAC TROMETHAMINE 10 MG: 30 INJECTION, SOLUTION INTRAMUSCULAR at 05:05

## 2020-05-31 RX ADMIN — Medication: at 03:05

## 2020-05-31 RX ADMIN — KETOROLAC TROMETHAMINE 10 MG: 30 INJECTION, SOLUTION INTRAMUSCULAR at 12:05

## 2020-05-31 RX ADMIN — URSODIOL 300 MG: 300 CAPSULE ORAL at 12:05

## 2020-05-31 RX ADMIN — ENOXAPARIN SODIUM 40 MG: 100 INJECTION SUBCUTANEOUS at 04:05

## 2020-05-31 RX ADMIN — TOPIRAMATE 200 MG: 100 TABLET, FILM COATED ORAL at 09:05

## 2020-05-31 RX ADMIN — PHENYTOIN SODIUM 100 MG: 100 CAPSULE, EXTENDED RELEASE ORAL at 03:05

## 2020-05-31 RX ADMIN — MORPHINE SULFATE 2 MG: 2 INJECTION, SOLUTION INTRAMUSCULAR; INTRAVENOUS at 04:05

## 2020-05-31 NOTE — PLAN OF CARE
Pt remains A+Ox4. PCA dc'd this shift, pt tolerating well, minimal complaints of pain managed with PRN medications. NGT remains in place, thick green output noted. CAN drain remains intact. Pt voiding freely. Safety precautions maintained.

## 2020-05-31 NOTE — PLAN OF CARE
Patient resting in bed, AAOx4. IVF and jose infusing as ordered. Medications administered as ordered. Patient with minimal complaints of pain overnight, using PCA occasionally. Asleep on and off through the shift. Incisions remain CDI. Encouraged to call with needs or concerns. Will continue to monitor.

## 2020-05-31 NOTE — PROGRESS NOTES
"Surgery Progress Note  05/31/2020      Interval HPI  ROB  Doing well without issues  Pain well controlled on PCA  Ambulating to restroom, making urine without issues  Denies flatus, n/v  AF, VSS    Objective  VITAL SIGNS: 24 HR MIN & MAX LAST    Temp  Min: 98 °F (36.7 °C)  Max: 98.4 °F (36.9 °C)  98 °F (36.7 °C)        BP  Min: 111/61  Max: 125/68  120/70     Pulse  Min: 72  Max: 84  84     Resp  Min: 16  Max: 20  20    SpO2  Min: 95 %  Max: 97 %  96 %      HT: 5' 5" (165.1 cm)  WT: 106.9 kg (235 lb 10.8 oz)  BMI: 39.2     Physical Exam:  Gen: No acute distress  Neuro: Aox3  HEENT: NCAT, EOMI   CV: Regular rate, extremities well perfused   Resp: Normal WOB  Abd: Soft, nondistended, appropriately TTP  MSK: No pedal edema    Results  Recent Labs   Lab 05/25/20  1320 05/29/20  1703 05/30/20  0426 05/31/20  0534   WBC 5.67  5.67 13.76* 10.57 9.13   HGB 13.0  13.0 12.3 12.3 10.9*   HCT 42.7  42.7 39.6 39.2 35.6*     231 188 152 178     139 138 138 136   CO2 22*  22* 17* 16* 19*   BUN 7  7 12 10 12   CREATININE 0.8  0.8 1.0 0.8 0.8   CALCIUM 9.0  9.0 7.9* 7.6* 7.9*   MG  --   --  2.2 2.0   PHOS  --   --  2.7 2.1*   ALKPHOS 98  98  --  89 80       Asessment/Plan  44 y.o. female with ZES s/p right salpingo oophorectomy, appendectomy, pancreatic and duodenal tumor resection on 5/29     - discontinue PCA, multimodal pain control  - continue mIVF while NPO  - continue NGT to LIWS  - stop Rachel  - PT, promote IS use and OOB to chair      Ppx: Juanjose Blanchard MD  [employer] General Surgery PGY-2    "

## 2020-06-01 LAB
ALBUMIN SERPL BCP-MCNC: 3.4 G/DL (ref 3.5–5.2)
ALP SERPL-CCNC: 88 U/L (ref 55–135)
ALT SERPL W/O P-5'-P-CCNC: 24 U/L (ref 10–44)
ANION GAP SERPL CALC-SCNC: 7 MMOL/L (ref 8–16)
AST SERPL-CCNC: 25 U/L (ref 10–40)
BASOPHILS # BLD AUTO: 0.01 K/UL (ref 0–0.2)
BASOPHILS NFR BLD: 0.1 % (ref 0–1.9)
BILIRUB SERPL-MCNC: 0.3 MG/DL (ref 0.1–1)
BUN SERPL-MCNC: 5 MG/DL (ref 6–20)
CALCIUM SERPL-MCNC: 8.4 MG/DL (ref 8.7–10.5)
CHLORIDE SERPL-SCNC: 111 MMOL/L (ref 95–110)
CO2 SERPL-SCNC: 19 MMOL/L (ref 23–29)
CREAT SERPL-MCNC: 0.8 MG/DL (ref 0.5–1.4)
DIFFERENTIAL METHOD: ABNORMAL
EOSINOPHIL # BLD AUTO: 0.2 K/UL (ref 0–0.5)
EOSINOPHIL NFR BLD: 2.4 % (ref 0–8)
ERYTHROCYTE [DISTWIDTH] IN BLOOD BY AUTOMATED COUNT: 13.2 % (ref 11.5–14.5)
EST. GFR  (AFRICAN AMERICAN): >60 ML/MIN/1.73 M^2
EST. GFR  (NON AFRICAN AMERICAN): >60 ML/MIN/1.73 M^2
GLUCOSE SERPL-MCNC: 98 MG/DL (ref 70–110)
HCT VFR BLD AUTO: 39.5 % (ref 37–48.5)
HGB BLD-MCNC: 12 G/DL (ref 12–16)
IMM GRANULOCYTES # BLD AUTO: 0.05 K/UL (ref 0–0.04)
IMM GRANULOCYTES NFR BLD AUTO: 0.5 % (ref 0–0.5)
LYMPHOCYTES # BLD AUTO: 1.4 K/UL (ref 1–4.8)
LYMPHOCYTES NFR BLD: 14.7 % (ref 18–48)
MAGNESIUM SERPL-MCNC: 1.9 MG/DL (ref 1.6–2.6)
MCH RBC QN AUTO: 26.8 PG (ref 27–31)
MCHC RBC AUTO-ENTMCNC: 30.4 G/DL (ref 32–36)
MCV RBC AUTO: 88 FL (ref 82–98)
MONOCYTES # BLD AUTO: 0.7 K/UL (ref 0.3–1)
MONOCYTES NFR BLD: 7.7 % (ref 4–15)
NEUTROPHILS # BLD AUTO: 7 K/UL (ref 1.8–7.7)
NEUTROPHILS NFR BLD: 74.6 % (ref 38–73)
NRBC BLD-RTO: 0 /100 WBC
PHOSPHATE SERPL-MCNC: 2.4 MG/DL (ref 2.7–4.5)
PLATELET # BLD AUTO: 193 K/UL (ref 150–350)
PMV BLD AUTO: 10.6 FL (ref 9.2–12.9)
POTASSIUM SERPL-SCNC: 3.8 MMOL/L (ref 3.5–5.1)
PROT SERPL-MCNC: 7.2 G/DL (ref 6–8.4)
RBC # BLD AUTO: 4.48 M/UL (ref 4–5.4)
SODIUM SERPL-SCNC: 137 MMOL/L (ref 136–145)
WBC # BLD AUTO: 9.32 K/UL (ref 3.9–12.7)

## 2020-06-01 PROCEDURE — 94761 N-INVAS EAR/PLS OXIMETRY MLT: CPT

## 2020-06-01 PROCEDURE — 63600175 PHARM REV CODE 636 W HCPCS: Performed by: FAMILY MEDICINE

## 2020-06-01 PROCEDURE — 36415 COLL VENOUS BLD VENIPUNCTURE: CPT

## 2020-06-01 PROCEDURE — 97535 SELF CARE MNGMENT TRAINING: CPT | Mod: CO

## 2020-06-01 PROCEDURE — 85025 COMPLETE CBC W/AUTO DIFF WBC: CPT

## 2020-06-01 PROCEDURE — 25000003 PHARM REV CODE 250: Performed by: SURGERY

## 2020-06-01 PROCEDURE — 11000001 HC ACUTE MED/SURG PRIVATE ROOM

## 2020-06-01 PROCEDURE — 99900035 HC TECH TIME PER 15 MIN (STAT)

## 2020-06-01 PROCEDURE — 97116 GAIT TRAINING THERAPY: CPT

## 2020-06-01 PROCEDURE — 80053 COMPREHEN METABOLIC PANEL: CPT

## 2020-06-01 PROCEDURE — 63600175 PHARM REV CODE 636 W HCPCS: Performed by: SURGERY

## 2020-06-01 PROCEDURE — 83735 ASSAY OF MAGNESIUM: CPT

## 2020-06-01 PROCEDURE — 84100 ASSAY OF PHOSPHORUS: CPT

## 2020-06-01 PROCEDURE — 97530 THERAPEUTIC ACTIVITIES: CPT | Mod: CO

## 2020-06-01 PROCEDURE — 97110 THERAPEUTIC EXERCISES: CPT

## 2020-06-01 PROCEDURE — 25000003 PHARM REV CODE 250: Performed by: STUDENT IN AN ORGANIZED HEALTH CARE EDUCATION/TRAINING PROGRAM

## 2020-06-01 RX ADMIN — PHENYTOIN SODIUM 100 MG: 100 CAPSULE, EXTENDED RELEASE ORAL at 03:06

## 2020-06-01 RX ADMIN — ZINC SULFATE 220 MG (50 MG) CAPSULE 220 MG: CAPSULE at 09:06

## 2020-06-01 RX ADMIN — URSODIOL 300 MG: 300 CAPSULE ORAL at 09:06

## 2020-06-01 RX ADMIN — TOPIRAMATE 200 MG: 100 TABLET, FILM COATED ORAL at 09:06

## 2020-06-01 RX ADMIN — MORPHINE SULFATE 2 MG: 2 INJECTION, SOLUTION INTRAMUSCULAR; INTRAVENOUS at 07:06

## 2020-06-01 RX ADMIN — DEXTROSE MONOHYDRATE, SODIUM CHLORIDE, AND POTASSIUM CHLORIDE: 50; 4.5; 1.49 INJECTION, SOLUTION INTRAVENOUS at 06:06

## 2020-06-01 RX ADMIN — OXYCODONE HYDROCHLORIDE AND ACETAMINOPHEN 1000 MG: 500 TABLET ORAL at 09:06

## 2020-06-01 RX ADMIN — PHENYTOIN SODIUM 100 MG: 100 CAPSULE, EXTENDED RELEASE ORAL at 09:06

## 2020-06-01 RX ADMIN — SODIUM PHOSPHATE, MONOBASIC, MONOHYDRATE 15 MMOL: 276; 142 INJECTION, SOLUTION INTRAVENOUS at 11:06

## 2020-06-01 RX ADMIN — PREGABALIN 75 MG: 75 CAPSULE ORAL at 09:06

## 2020-06-01 RX ADMIN — MORPHINE SULFATE 2 MG: 2 INJECTION, SOLUTION INTRAMUSCULAR; INTRAVENOUS at 11:06

## 2020-06-01 RX ADMIN — IRON SUCROSE 100 MG: 20 INJECTION, SOLUTION INTRAVENOUS at 09:06

## 2020-06-01 RX ADMIN — CYANOCOBALAMIN 1000 MCG: 1000 INJECTION, SOLUTION INTRAMUSCULAR; SUBCUTANEOUS at 09:06

## 2020-06-01 RX ADMIN — VITAMIN E CAP 400 UNIT 400 UNITS: 400 CAP at 09:06

## 2020-06-01 RX ADMIN — MORPHINE SULFATE 2 MG: 2 INJECTION, SOLUTION INTRAMUSCULAR; INTRAVENOUS at 03:06

## 2020-06-01 RX ADMIN — DEXTROSE MONOHYDRATE, SODIUM CHLORIDE, AND POTASSIUM CHLORIDE: 50; 4.5; 1.49 INJECTION, SOLUTION INTRAVENOUS at 10:06

## 2020-06-01 RX ADMIN — ENOXAPARIN SODIUM 40 MG: 100 INJECTION SUBCUTANEOUS at 05:06

## 2020-06-01 NOTE — PT/OT/SLP PROGRESS
Occupational Therapy   Treatment    Name: Kristan Goncalves  MRN: [id number]  Admitting Diagnosis: The encounter diagnosis was Carcinoid tumor of stomach.  3 Days Post-Op    Recommendations:     Discharge Recommendations: home  Discharge Equipment Recommendations:  shower chair  Barriers to discharge:  None    Assessment:   Patient /c slightly unsteady gait, but CGA /s AD. Patient will benefit from continued skilled OT to address deficits and improve performance in functional ADL tasks. Cont OT.    Kristan Goncalves is a 44 y.o. female with a medical diagnosis of The encounter diagnosis was Carcinoid tumor of stomach. Performance deficits affecting function are weakness, impaired endurance, impaired self care skills, impaired functional mobilty, impaired balance, pain, decreased ROM.     Rehab Prognosis:  Good; patient would benefit from acute skilled OT services to address these deficits and reach maximum level of function.       Plan:     Patient to be seen 5 x/week to address the above listed problems via self-care/home management, therapeutic activities, therapeutic exercises  · Plan of Care Expires: 06/30/20  · Plan of Care Reviewed with: patient    Subjective     Pain/Comfort:  · Pain Rating 1: 2/10  · Location - Orientation 1: generalized  · Location 1: abdomen  · Pain Addressed 1: Reposition, Distraction, Cessation of Activity  · Pain Rating Post-Intervention 1: 4/10    Objective:     Communicated with: nurseGisella prior to session.  Patient found HOB elevated with bed alarm, NG tube, peripheral IV upon OT entry to room.    General Precautions: Standard, fall   Orthopedic Precautions:N/A   Braces: N/A     Bed Mobility:    · Patient completed Scooting/Bridging with stand by assistance  · Patient completed Supine to Sit with stand by assistance and with side rail     Functional Mobility/Transfers:  · Patient completed Sit <> Stand Transfer with stand by assistance  with  no assistive device    · Patient completed Bed <> Chair Transfer using Step Transfer technique with contact guard assistance with no assistive device    Activities of Daily Living:  · Grooming: set-up and CGA only to manage NGT during oral care      [facility] 6 Click ADL: 13    Treatment & Education:  · Increased time, effort and VCs for bed mob and sit <> stand   · CGA for ambulation in room, some unsteadiness, but no LOB  · ADLs from chair level -- (A) to move NGT during oral care    Patient left up in chair with all lines intact, call button in reach and nsg notifiedEducation:      GOALS:   Multidisciplinary Problems     Occupational Therapy Goals        Problem: Occupational Therapy Goal    Goal Priority Disciplines Outcome Interventions   Occupational Therapy Goal     OT, PT/OT Ongoing, Progressing    Description:  Goals to be met by: 6/30/2020    Patient will increase functional independence with ADLs by performing:    UE Dressing with Modified [place].  LE Dressing with Modified [place]/Supervision.  Grooming while standing at sink with Modified [place].  Toileting from toilet with Modified [place] for hygiene and clothing management.   Toilet transfer to toilet with Modified [place].                      Time Tracking:     OT Date of Treatment: 06/01/20  OT Start Time: 0954  OT Stop Time: 1017  OT Total Time (min): 23 min    Billable Minutes:Self Care/Home Management 10  Therapeutic Activity 13    DREA Fang/SUDARSHAN  6/1/2020

## 2020-06-01 NOTE — PLAN OF CARE
TN met with patient at bedside to complete discharge assessment. Currently patient lives at home with family and is independent of ADL's.  No DME noted. Patient states that she was receiving HH prior to admission but doesn't remember the HH agency. Upon discharge, patient's family will provide transportation home and will be avvaible to assist as needed.     TN updated whiteboard with contact information. Discharge brochure given to patient. TN will follow throughout transitions of care and will assist with any discharge needs.       06/01/20 6233   Discharge Assessment   Assessment Type Discharge Planning Assessment   Confirmed/corrected address and phone number on facesheet? Yes   Assessment information obtained from? Patient;Medical Record   Expected Length of Stay (days) 3   Prior to hospitilization cognitive status: Alert/Oriented   Prior to hospitalization functional status: Independent   Current cognitive status: Alert/Oriented   Current Functional Status: Needs Assistance   Lives With other relative(s)   Able to Return to Prior Arrangements other (see comments)  (TBD)   Is patient able to care for self after discharge? Unable to determine at this time (comments)   Patient's perception of discharge disposition home or selfcare   Readmission Within the Last 30 Days no previous admission in last 30 days   Patient currently being followed by outpatient case management? No   Patient currently receives any other outside agency services? No   Equipment Currently Used at Home none   Do you have any problems affording any of your prescribed medications? No   Is the patient taking medications as prescribed? yes   Does the patient have transportation home? Yes   Transportation Anticipated family or friend will provide   Does the patient receive services at the [facility]? No   Discharge Plan A Home with family   Discharge Plan B Other   DME Needed Upon Discharge  other (see comments)  (TBD)   Patient/Family in  Agreement with Plan yes

## 2020-06-01 NOTE — PT/OT/SLP PROGRESS
Physical Therapy Treatment    Patient Name:  Kristan Goncalves   MRN:  [id number]    Recommendations:     Discharge Recommendations:  home   Discharge Equipment Recommendations: shower chair   Barriers to discharge: None    Assessment:     Kristan Goncalves is a 44 y.o. female admitted with a medical diagnosis of <principal problem not specified>.  She presents with the following impairments/functional limitations:  weakness, gait instability, impaired balance, impaired endurance, decreased ROM, decreased lower extremity function, impaired functional mobilty, impaired self care skills, pain, impaired skin, edema . Patient found up in chair at bedside. Sit <> stand with CG assist. Gait with RW ~ 30 ft and SBA/CGA.  Slow breezy with small step lengths bilaterally. Somewhat flexed posture using RW    Rehab Prognosis: Good; patient would benefit from acute skilled PT services to address these deficits and reach maximum level of function.    Recent Surgery: Procedure(s) (LRB):  DUODENECTOMY ROBOTIC (N/A)  OOPHORECTOMY (Right)  ROBOTIC APPENDECTOMY (N/A)  REMOVAL cyst (Right)  LAPAROTOMY, EXPLORATORY, DUODENAL TUMOR RESECTION, PANCREATIC TUMOR RESECTION, LIVER ULTRASOUND 3 Days Post-Op    Plan:     During this hospitalization, patient to be seen 6 x/week to address the identified rehab impairments via gait training, therapeutic activities, therapeutic exercises and progress toward the following goals:    · Plan of Care Expires:  06/30/20    Subjective     Chief Complaint: pain   Patient/Family Comments/goals: go home  Pain/Comfort:  · Pain Rating 1: 3/10  · Location - Orientation 1: generalized  · Location 1: abdomen  · Pain Addressed 1: Distraction, Reposition  · Pain Rating Post-Intervention 1: 4/10      Objective:     Communicated with [facility] nurse prior to session.  Patient found up in chair with NG tube, telemetry, peripheral IV upon PT entry to room.     General Precautions: Standard, fall   Orthopedic  Precautions:N/A   Braces:       Functional Mobility:  · Transfers:     · Sit to Stand:  stand by assistance and contact guard assistance with rolling walker  · Gait: 30 ft with RW CG to SBA  · Balance: fair - with AD      AM-PAC 6 CLICK MOBILITY  Turning over in bed (including adjusting bedclothes, sheets and blankets)?: 3  Sitting down on and standing up from a chair with arms (e.g., wheelchair, bedside commode, etc.): 3  Moving from lying on back to sitting on the side of the bed?: 2  Moving to and from a bed to a chair (including a wheelchair)?: 3  Need to walk in hospital room?: 3  Climbing 3-5 steps with a railing?: 3  Basic Mobility Total Score: 17       Therapeutic Activities and Exercises:   Reviewed and performed ankle pumps,LAQ's and marching seated in chair ~ 12 reps each    Patient left up in chair with all lines intact, call button in reach and primary nurse notified..    GOALS:   Multidisciplinary Problems     Physical Therapy Goals        Problem: Physical Therapy Goal    Goal Priority Disciplines Outcome Goal Variances Interventions   Physical Therapy Goal     PT, PT/OT Ongoing, Progressing     Description:  Goals to be met by: 20     Patient will increase functional independence with mobility by performin. Supine <> sit with Stand-by Assistance  2. Sit to stand transfer with Supervision  3. Bed to chair transfer with Supervision    4. Gait  x 150 feet with Supervision  5. Lower extremity exercise program x 10 reps per handout, with supervision                      Time Tracking:     PT Received On: 20  PT Start Time: 1103     PT Stop Time: 1126  PT Total Time (min): 23 min     Billable Minutes: Gait Training 13 and Therapeutic Exercise 10    Treatment Type: Treatment  PT/PTA: PT           Musa Mensah, PT  2020

## 2020-06-01 NOTE — PLAN OF CARE
Problem: Physical Therapy Goal  Goal: Physical Therapy Goal  Description  Goals to be met by: 20     Patient will increase functional independence with mobility by performin. Supine <> sit with Stand-by Assistance  2. Sit to stand transfer with Supervision  3. Bed to chair transfer with Supervision    4. Gait  x 150 feet with Supervision  5. Lower extremity exercise program x 10 reps per handout, with supervision     Outcome: Ongoing, Progressing   Recommend Home

## 2020-06-01 NOTE — PROGRESS NOTES
"Surgery Progress Note  06/01/2020      Interval HPI  NAEON  Afebrile  Pain controlled on current regimen  NPO, NG with 530 output  No flatus or BM    Objective  VITAL SIGNS: 24 HR MIN & MAX LAST    Temp  Min: 97.2 °F (36.2 °C)  Max: 99.2 °F (37.3 °C)  99.2 °F (37.3 °C)        BP  Min: 117/69  Max: 130/71  121/65     Pulse  Min: 77  Max: 84  78     Resp  Min: 16  Max: 20  18    SpO2  Min: 94 %  Max: 97 %  95 %      HT: 5' 5" (165.1 cm)  WT: 103.7 kg (228 lb 9.9 oz)  BMI: 38     Physical Exam:  Gen: No acute distress  Neuro: Aox3  HEENT: NCAT, EOMI   CV: Regular rate, extremities well perfused   Resp: Normal WOB  Abd: Soft, nondistended, appropriately TTP  MSK: No pedal edema    Results  Recent Labs   Lab 05/25/20  1320 05/29/20  1703 05/30/20  0426 05/31/20  0534 06/01/20  0755   WBC 5.67  5.67 13.76* 10.57 9.13 9.32   HGB 13.0  13.0 12.3 12.3 10.9* 12.0   HCT 42.7  42.7 39.6 39.2 35.6* 39.5     231 188 152 178 193     139 138 138 136  --    CO2 22*  22* 17* 16* 19*  --    BUN 7  7 12 10 12  --    CREATININE 0.8  0.8 1.0 0.8 0.8  --    CALCIUM 9.0  9.0 7.9* 7.6* 7.9*  --    MG  --   --  2.2 2.0  --    PHOS  --   --  2.7 2.1*  --    ALKPHOS 98  98  --  89 80  --        Asessment/Plan  44 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

## 2020-06-01 NOTE — PLAN OF CARE
Pt AAOx4, pt complains of intermittent abd pain but denies n/v/d and SOB. Pt remains NPO except meds. NGT to LCWS with minimal output. CAN drain intact. IVF infusing per MAR to PIV. Safety maintained, bed alarm on - will cont to monitor.

## 2020-06-01 NOTE — PLAN OF CARE
Problem: Occupational Therapy Goal  Goal: Occupational Therapy Goal  Description  Goals to be met by: 6/30/2020    Patient will increase functional independence with ADLs by performing:    UE Dressing with Modified [place].  LE Dressing with Modified [place]/Supervision.  Grooming while standing at sink with Modified [place].  Toileting from toilet with Modified [place] for hygiene and clothing management.   Toilet transfer to toilet with Modified [place].     Outcome: Ongoing, Progressing     Patient /c slightly unsteady gait, but CGA /s AD. Patient will benefit from continued skilled OT to address deficits and improve performance in functional ADL tasks. Cont OT.

## 2020-06-02 LAB
ALBUMIN SERPL BCP-MCNC: 3.6 G/DL (ref 3.5–5.2)
ALP SERPL-CCNC: 99 U/L (ref 55–135)
ALT SERPL W/O P-5'-P-CCNC: 23 U/L (ref 10–44)
ANION GAP SERPL CALC-SCNC: 11 MMOL/L (ref 8–16)
AST SERPL-CCNC: 20 U/L (ref 10–40)
BASOPHILS # BLD AUTO: 0.01 K/UL (ref 0–0.2)
BASOPHILS NFR BLD: 0.1 % (ref 0–1.9)
BILIRUB SERPL-MCNC: 0.4 MG/DL (ref 0.1–1)
BUN SERPL-MCNC: 5 MG/DL (ref 6–20)
CALCIUM SERPL-MCNC: 8.8 MG/DL (ref 8.7–10.5)
CHLORIDE SERPL-SCNC: 107 MMOL/L (ref 95–110)
CO2 SERPL-SCNC: 19 MMOL/L (ref 23–29)
CREAT SERPL-MCNC: 0.8 MG/DL (ref 0.5–1.4)
DIFFERENTIAL METHOD: ABNORMAL
EOSINOPHIL # BLD AUTO: 0.3 K/UL (ref 0–0.5)
EOSINOPHIL NFR BLD: 2.9 % (ref 0–8)
ERYTHROCYTE [DISTWIDTH] IN BLOOD BY AUTOMATED COUNT: 13 % (ref 11.5–14.5)
EST. GFR  (AFRICAN AMERICAN): >60 ML/MIN/1.73 M^2
EST. GFR  (NON AFRICAN AMERICAN): >60 ML/MIN/1.73 M^2
GLUCOSE SERPL-MCNC: 100 MG/DL (ref 70–110)
HCT VFR BLD AUTO: 39.8 % (ref 37–48.5)
HGB BLD-MCNC: 12.3 G/DL (ref 12–16)
IMM GRANULOCYTES # BLD AUTO: 0.07 K/UL (ref 0–0.04)
IMM GRANULOCYTES NFR BLD AUTO: 0.7 % (ref 0–0.5)
LYMPHOCYTES # BLD AUTO: 1.6 K/UL (ref 1–4.8)
LYMPHOCYTES NFR BLD: 15.2 % (ref 18–48)
MAGNESIUM SERPL-MCNC: 2 MG/DL (ref 1.6–2.6)
MCH RBC QN AUTO: 26.4 PG (ref 27–31)
MCHC RBC AUTO-ENTMCNC: 30.9 G/DL (ref 32–36)
MCV RBC AUTO: 85 FL (ref 82–98)
MONOCYTES # BLD AUTO: 0.7 K/UL (ref 0.3–1)
MONOCYTES NFR BLD: 7.1 % (ref 4–15)
NEUTROPHILS # BLD AUTO: 7.6 K/UL (ref 1.8–7.7)
NEUTROPHILS NFR BLD: 74 % (ref 38–73)
NRBC BLD-RTO: 0 /100 WBC
PHOSPHATE SERPL-MCNC: 3.1 MG/DL (ref 2.7–4.5)
PLATELET # BLD AUTO: 219 K/UL (ref 150–350)
PMV BLD AUTO: 10.7 FL (ref 9.2–12.9)
POTASSIUM SERPL-SCNC: 3.6 MMOL/L (ref 3.5–5.1)
PROT SERPL-MCNC: 7.7 G/DL (ref 6–8.4)
RBC # BLD AUTO: 4.66 M/UL (ref 4–5.4)
SODIUM SERPL-SCNC: 137 MMOL/L (ref 136–145)
WBC # BLD AUTO: 10.21 K/UL (ref 3.9–12.7)

## 2020-06-02 PROCEDURE — 80053 COMPREHEN METABOLIC PANEL: CPT

## 2020-06-02 PROCEDURE — 97110 THERAPEUTIC EXERCISES: CPT | Mod: CO

## 2020-06-02 PROCEDURE — 63600175 PHARM REV CODE 636 W HCPCS: Performed by: SURGERY

## 2020-06-02 PROCEDURE — 97530 THERAPEUTIC ACTIVITIES: CPT | Mod: CO

## 2020-06-02 PROCEDURE — 63600175 PHARM REV CODE 636 W HCPCS: Performed by: FAMILY MEDICINE

## 2020-06-02 PROCEDURE — 36415 COLL VENOUS BLD VENIPUNCTURE: CPT

## 2020-06-02 PROCEDURE — 11000001 HC ACUTE MED/SURG PRIVATE ROOM

## 2020-06-02 PROCEDURE — 85025 COMPLETE CBC W/AUTO DIFF WBC: CPT

## 2020-06-02 PROCEDURE — 99900035 HC TECH TIME PER 15 MIN (STAT)

## 2020-06-02 PROCEDURE — 25000003 PHARM REV CODE 250: Performed by: SURGERY

## 2020-06-02 PROCEDURE — 97116 GAIT TRAINING THERAPY: CPT

## 2020-06-02 PROCEDURE — 83735 ASSAY OF MAGNESIUM: CPT

## 2020-06-02 PROCEDURE — 25000003 PHARM REV CODE 250: Performed by: STUDENT IN AN ORGANIZED HEALTH CARE EDUCATION/TRAINING PROGRAM

## 2020-06-02 PROCEDURE — 84100 ASSAY OF PHOSPHORUS: CPT

## 2020-06-02 RX ORDER — SIMETHICONE 80 MG
1 TABLET,CHEWABLE ORAL 3 TIMES DAILY PRN
Status: DISCONTINUED | OUTPATIENT
Start: 2020-06-02 | End: 2020-06-05 | Stop reason: HOSPADM

## 2020-06-02 RX ADMIN — PREGABALIN 75 MG: 75 CAPSULE ORAL at 08:06

## 2020-06-02 RX ADMIN — OXYCODONE HYDROCHLORIDE AND ACETAMINOPHEN 1000 MG: 500 TABLET ORAL at 09:06

## 2020-06-02 RX ADMIN — DEXTROSE MONOHYDRATE, SODIUM CHLORIDE, AND POTASSIUM CHLORIDE: 50; 4.5; 1.49 INJECTION, SOLUTION INTRAVENOUS at 11:06

## 2020-06-02 RX ADMIN — PHENYTOIN SODIUM 100 MG: 100 CAPSULE, EXTENDED RELEASE ORAL at 09:06

## 2020-06-02 RX ADMIN — VITAMIN E CAP 400 UNIT 400 UNITS: 400 CAP at 09:06

## 2020-06-02 RX ADMIN — MORPHINE SULFATE 2 MG: 2 INJECTION, SOLUTION INTRAMUSCULAR; INTRAVENOUS at 01:06

## 2020-06-02 RX ADMIN — TOPIRAMATE 200 MG: 100 TABLET, FILM COATED ORAL at 09:06

## 2020-06-02 RX ADMIN — MORPHINE SULFATE 2 MG: 2 INJECTION, SOLUTION INTRAMUSCULAR; INTRAVENOUS at 05:06

## 2020-06-02 RX ADMIN — OXYCODONE HYDROCHLORIDE AND ACETAMINOPHEN 1000 MG: 500 TABLET ORAL at 08:06

## 2020-06-02 RX ADMIN — URSODIOL 300 MG: 300 CAPSULE ORAL at 08:06

## 2020-06-02 RX ADMIN — DEXTROSE MONOHYDRATE, SODIUM CHLORIDE, AND POTASSIUM CHLORIDE: 50; 4.5; 1.49 INJECTION, SOLUTION INTRAVENOUS at 08:06

## 2020-06-02 RX ADMIN — CYANOCOBALAMIN 1000 MCG: 1000 INJECTION, SOLUTION INTRAMUSCULAR; SUBCUTANEOUS at 09:06

## 2020-06-02 RX ADMIN — IRON SUCROSE 100 MG: 20 INJECTION, SOLUTION INTRAVENOUS at 09:06

## 2020-06-02 RX ADMIN — TOPIRAMATE 200 MG: 100 TABLET, FILM COATED ORAL at 08:06

## 2020-06-02 RX ADMIN — URSODIOL 300 MG: 300 CAPSULE ORAL at 09:06

## 2020-06-02 RX ADMIN — SIMETHICONE CHEW TAB 80 MG 80 MG: 80 TABLET ORAL at 09:06

## 2020-06-02 RX ADMIN — ZINC SULFATE 220 MG (50 MG) CAPSULE 220 MG: CAPSULE at 09:06

## 2020-06-02 RX ADMIN — MORPHINE SULFATE 2 MG: 2 INJECTION, SOLUTION INTRAMUSCULAR; INTRAVENOUS at 08:06

## 2020-06-02 RX ADMIN — PHENYTOIN SODIUM 100 MG: 100 CAPSULE, EXTENDED RELEASE ORAL at 08:06

## 2020-06-02 RX ADMIN — PHENYTOIN SODIUM 100 MG: 100 CAPSULE, EXTENDED RELEASE ORAL at 02:06

## 2020-06-02 RX ADMIN — PREGABALIN 75 MG: 75 CAPSULE ORAL at 09:06

## 2020-06-02 RX ADMIN — ENOXAPARIN SODIUM 40 MG: 100 INJECTION SUBCUTANEOUS at 04:06

## 2020-06-02 NOTE — PHYSICIAN QUERY
PT Name: Kristan Goncalves  MR #: [id number]     PHYSICIAN QUERY -  ELECTROLYTE CLARIFICATION      CDS: Brandy Capley, RN  Email: [email or web address]    This form is a permanent document in the medical record.     Query Date: June 2, 2020    By submitting this query, we are merely seeking further clarification of documentation to reflect the severity of illness of your patient. Please utilize your independent clinical judgment when addressing the question(s) below.    The Medical record reflects the following:     Indicators   Supporting Clinical Findings Location in Medical Record   X Lab Value(s) Calcium: 7.9 (L)    Phosphorus: 2.1 (L)  Phosphorus: 2.4 (L)   Labs 5/29/2020 17:03    Labs 5/31/2020 05:34  Labs 6/1/2020 07:55   X Treatment                                 Medication calcium gluconate 1g in dextrose 5% 100mL  Admin Date 05/29/2020  17:00   calcium gluconate 1g in dextrose 5% 100mL  Admin Date 05/29/2020  18:00     potassium phosphate 20 mmol in dextrose 5 % 500 mL infusion Admin Date 05/31/2020  17:45   sodium phosphate 15 mmol in dextrose 5 % 250 mL IVPB Admin Date 06/01/2020  11:24  sodium phosphate 30 mmol in dextrose 5 % 250 mL IVPB Admin Date 05/31/2020  22:27 MAR 5/29      MAR 5/31-6/1    Other       Provider, please specify the diagnosis or diagnoses that correspond(s) to the above indicators. Sae all that apply:    [ xx  ] Hypocalcemia   [ xx  ] Hypophosphatemia   [   ] Other electrolyte disturbance (please specify): _______   [   ]  Clinically Undetermined       Please document in your progress notes daily for the duration of treatment until resolved, and include in your discharge summary.

## 2020-06-02 NOTE — PLAN OF CARE
Pt AAOx4, pt with complaints of abdomen pain with relief from morphine. Pt without any nausea but belching.  Pt remains NPO except meds. NGT to LCWS with yellow/green thick output.. CAN drain with serosanguinous output. Pt out of bed into chair and ambulating to the bathroom with minimal  assistance.

## 2020-06-02 NOTE — PLAN OF CARE
Problem: Physical Therapy Goal  Goal: Physical Therapy Goal  Description  Goals to be met by: 20     Patient will increase functional independence with mobility by performin. Supine <> sit with Stand-by Assistance  2. Sit to stand transfer with Supervision  3. Bed to chair transfer with Supervision    4. Gait  x 150 feet with Supervision  5. Lower extremity exercise program x 10 reps per handout, with supervision     Outcome: Ongoing, Progressing   Recommend Home   May need RW

## 2020-06-02 NOTE — PROGRESS NOTES
"Surgery Progress Note  06/02/2020      Interval HPI  NAEON  Afebrile  Pain controlled on current regimen, c/o gas pains  NPO, NG with >600 output  No flatus or BM, feels she is starting to move her bowels though    Objective  VITAL SIGNS: 24 HR MIN & MAX LAST    Temp  Min: 98.6 °F (37 °C)  Max: 99.5 °F (37.5 °C)  98.6 °F (37 °C)        BP  Min: 119/57  Max: 123/63  123/63     Pulse  Min: 78  Max: 92  89     Resp  Min: 18  Max: 20  20    SpO2  Min: 95 %  Max: 98 %  97 %      HT: 5' 5" (165.1 cm)  WT: 102.7 kg (226 lb 6.6 oz)  BMI: 37.7     Physical Exam:  Gen: No acute distress  Neuro: Aox3  HEENT: NCAT, EOMI   CV: Regular rate, extremities well perfused   Resp: Normal WOB  Abd: Soft, nondistended, appropriately TTP  CAN with 60 serosanguinous output  MSK: No pedal edema    Results  Recent Labs   Lab 05/29/20  1703 05/30/20  0426 05/31/20  0534 06/01/20  0755   WBC 13.76* 10.57 9.13 9.32   HGB 12.3 12.3 10.9* 12.0   HCT 39.6 39.2 35.6* 39.5    152 178 193    138 136 137   CO2 17* 16* 19* 19*   BUN 12 10 12 5*   CREATININE 1.0 0.8 0.8 0.8   CALCIUM 7.9* 7.6* 7.9* 8.4*   MG  --  2.2 2.0 1.9   PHOS  --  2.7 2.1* 2.4*   ALKPHOS  --  89 80 88       Asessment/Plan  44 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

## 2020-06-02 NOTE — PLAN OF CARE
VN note: VN cued into patient's room for introduction. Patient sitting in chair at this time. NGT in place. VN informed patient that VN would be working closely along side bedside nurse, PCT, and the rest of the care team and making rounds throughout the shift. Patient verbalized understanding. Allowed time for questions. VN will continue to be available to patient and intervene prn.

## 2020-06-02 NOTE — PT/OT/SLP PROGRESS
"Physical Therapy Treatment    Patient Name:  Kristan Goncalves   MRN:  [id number]    Recommendations:     Discharge Recommendations:  home   Discharge Equipment Recommendations: other (see comments)(may need RW)   Barriers to discharge: None    Assessment:     Kristan Goncalves is a 44 y.o. 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   She presents with the following impairments/functional limitations:  weakness, gait instability, decreased ROM, impaired balance, decreased lower extremity function, impaired endurance, impaired skin, pain, edema, impaired functional mobilty, impaired self care skills . Patient found up in chair at bedside. NG tube attached to wall and draining. Patient performed AROM ex BLE ankle pumps,SAQ and marching in chair. Stood with RW and performed SPT CG assist chair to bed taking ~ 5 steps. Sit <> supine with min assist LE's/ .    Rehab Prognosis: Good; patient would benefit from acute skilled PT services to address these deficits and reach maximum level of function.    Recent Surgery: Procedure(s) (LRB):  DUODENECTOMY ROBOTIC (N/A)  OOPHORECTOMY (Right)  ROBOTIC APPENDECTOMY (N/A)  REMOVAL cyst (Right)  LAPAROTOMY, EXPLORATORY, DUODENAL TUMOR RESECTION, PANCREATIC TUMOR RESECTION, LIVER ULTRASOUND 4 Days Post-Op    Plan:     During this hospitalization, patient to be seen 6 x/week to address the identified rehab impairments via gait training, therapeutic activities, therapeutic exercises and progress toward the following goals:    · Plan of Care Expires:  06/30/20    Subjective     Chief Complaint: pain and fatigue " I have been up all morning"  Patient/Family Comments/goals: go home  Pain/Comfort:  · Pain Rating 1: 4/10  · Location - Side 1: Bilateral  · Location - Orientation 1: generalized  · Location 1: abdomen  · Pain Addressed 1: " Distraction, Reposition  · Pain Rating Post-Intervention 1: 4/10      Objective:     Communicated with primary nures prior to session.  Patient found up in chair with NG tube, peripheral IV, telemetry upon PT entry to room.     General Precautions: Standard, fall   Orthopedic Precautions:N/A   Braces: N/A     Functional Mobility:  · Bed Mobility:     · Sit to Supine: minimum assistance  · Transfers:     · Sit to Stand:  contact guard assistance with rolling walker  · Gait: 5 steps with SPT using RW and CG assist  · Balance: fair with AD      AM-PAC 6 CLICK MOBILITY  Turning over in bed (including adjusting bedclothes, sheets and blankets)?: 3  Sitting down on and standing up from a chair with arms (e.g., wheelchair, bedside commode, etc.): 3  Moving from lying on back to sitting on the side of the bed?: 3  Moving to and from a bed to a chair (including a wheelchair)?: 3  Need to walk in hospital room?: 3  Climbing 3-5 steps with a railing?: 3  Basic Mobility Total Score: 18       Therapeutic Activities and Exercises:   see assessment    Patient left HOB elevated with all lines intact, call button in reach and bed alarm on..    GOALS:   Multidisciplinary Problems     Physical Therapy Goals        Problem: Physical Therapy Goal    Goal Priority Disciplines Outcome Goal Variances Interventions   Physical Therapy Goal     PT, PT/OT Ongoing, Progressing     Description:  Goals to be met by: 20     Patient will increase functional independence with mobility by performin. Supine <> sit with Stand-by Assistance  2. Sit to stand transfer with Supervision  3. Bed to chair transfer with Supervision    4. Gait  x 150 feet with Supervision  5. Lower extremity exercise program x 10 reps per handout, with supervision                      Time Tracking:     PT Received On: 20  PT Start Time: 1315     PT Stop Time: 1336  PT Total Time (min): 21 min     Billable Minutes: Gait Training 21    Treatment Type:  Treatment  PT/PTA: PT           Musa Mensah, PT  06/02/2020

## 2020-06-02 NOTE — PLAN OF CARE
Patient is AAO X 4. Vital signs stable. Just given IV Morphine for pain. Remains NPO except oral medicines, NGT to LCWS and put out 1000 ml of green thick fluid. IV fluids in progress. CAN drain in place with serosanguineous output. She voided freely. Slept on and off. Safety measures maintained. Will continue to monitor patient.

## 2020-06-02 NOTE — PT/OT/SLP PROGRESS
Occupational Therapy   Treatment    Name: Kristan Goncalves  MRN: [id number]  Admitting Diagnosis:  <principal problem not specified>  4 Days Post-Op    Recommendations:     Discharge Recommendations: home  Discharge Equipment Recommendations:  shower chair  Barriers to discharge:  None    Assessment:     Kristan Goncalves is a 44 y.o. female with a medical diagnosis of <principal problem not specified>.  Performance deficits affecting function are weakness, impaired endurance, impaired self care skills, impaired functional mobilty, gait instability, impaired balance, decreased lower extremity function, decreased safety awareness, pain, impaired skin, edema, impaired cardiopulmonary response to activity. Pt found in bed, agreeable to therapy.   She tolerated tx fairly well. Continues with decreased endurance-easily fatigues and moves slow. Pt progressing towards goals. Continue OT services to address functional goals, progressing as able.      Rehab Prognosis:  Good; patient would benefit from acute skilled OT services to address these deficits and reach maximum level of function.       Plan:     Patient to be seen 5 x/week to address the above listed problems via self-care/home management, therapeutic activities, therapeutic exercises  · Plan of Care Expires: 06/30/20  · Plan of Care Reviewed with: patient    Subjective     Pain/Comfort:  · Pain Rating 1: 3/10  · Location - Side 1: Bilateral  · Location - Orientation 1: generalized  · Location 1: abdomen  · Pain Addressed 1: Reposition, Distraction  · Pain Rating Post-Intervention 1: 3/10    Objective:     Communicated with: RN prior to session.  Patient found HOB elevated with NG tube, telemetry, peripheral IV upon OT entry to room.    General Precautions: Standard, fall   Orthopedic Precautions:N/A   Braces: N/A     Occupational Performance:     Bed Mobility:    · Patient completed Rolling/Turning to Left with  supervision  · Patient completed  Scooting/Bridging with supervision  · Patient completed Supine to Sit with supervision     Functional Mobility/Transfers:  · Patient completed Sit <> Stand Transfer with stand by assistance  with  no assistive device   · Patient completed Bed <> Chair Transfer using Stand Pivot technique with contact guard assistance with hand-held assist  · Functional Mobility: Pt took 4 steps to chair during transfer with CGA/HHA.     Activities of Daily Living:  · Lower Body Dressing: stand by assistance doff/kari socks chair level  Pt declined toileting and G/H at sink secondary to already performed.     [facility] 6 Click ADL: 16    Treatment & Education:  Pt performed BUE AROM ex 2 x 10 reps all jts/planes.  Pt tolerated well with rest breaks secondary to min SOB and muscle fatigue.   Pt instructed on PLB techniques with good carry over.     Patient left up in chair with all lines intact, call button in reach and RN notifiedEducation:      GOALS:   Multidisciplinary Problems     Occupational Therapy Goals        Problem: Occupational Therapy Goal    Goal Priority Disciplines Outcome Interventions   Occupational Therapy Goal     OT, PT/OT Ongoing, Progressing    Description:  Goals to be met by: 6/30/2020    Patient will increase functional independence with ADLs by performing:    UE Dressing with Modified [place].  LE Dressing with Modified [place]/Supervision.  Grooming while standing at sink with Modified [place].  Toileting from toilet with Modified [place] for hygiene and clothing management.   Toilet transfer to toilet with Modified [place].                      Time Tracking:     OT Date of Treatment: 06/02/20  OT Start Time: 0930  OT Stop Time: 0954  OT Total Time (min): 24 min    Billable Minutes:Therapeutic Activity 10  Therapeutic Exercise 14    AIDEE Neumann  6/2/2020

## 2020-06-02 NOTE — PLAN OF CARE
Pt. AAO x4.  Complaint of gas and pain on shift. Gas relieved, pain intermittent.  NGT to low cont. Suction.  Worked with PT/OT.   IV fluids infusing.  CAN drain intact with serosanguinous drainage.  Safety maintained.

## 2020-06-02 NOTE — PLAN OF CARE
Problem: Occupational Therapy Goal  Goal: Occupational Therapy Goal  Description  Goals to be met by: 6/30/2020    Patient will increase functional independence with ADLs by performing:    UE Dressing with Modified [place].  LE Dressing with Modified [place]/Supervision.  Grooming while standing at sink with Modified [place].  Toileting from toilet with Modified [place] for hygiene and clothing management.   Toilet transfer to toilet with Modified [place].     Outcome: Ongoing, Progressing       Kristan Goncalves is a 44 y.o. female with a medical diagnosis of <principal problem not specified>.  Performance deficits affecting function are weakness, impaired endurance, impaired self care skills, impaired functional mobilty, gait instability, impaired balance, decreased lower extremity function, decreased safety awareness, pain, impaired skin, edema, impaired cardiopulmonary response to activity. Pt found in bed, agreeable to therapy.   She tolerated tx fairly well. Continues with decreased endurance-easily fatigues and moves slow. Pt progressing towards goals. Continue OT services to address functional goals, progressing as able.    AIDEE Neumann

## 2020-06-03 LAB
ALBUMIN SERPL BCP-MCNC: 3.5 G/DL (ref 3.5–5.2)
ALP SERPL-CCNC: 114 U/L (ref 55–135)
ALT SERPL W/O P-5'-P-CCNC: 33 U/L (ref 10–44)
AMYLASE, BODY FLUID: 36 U/L
ANION GAP SERPL CALC-SCNC: 9 MMOL/L (ref 8–16)
AST SERPL-CCNC: 38 U/L (ref 10–40)
BASOPHILS # BLD AUTO: 0.02 K/UL (ref 0–0.2)
BASOPHILS NFR BLD: 0.2 % (ref 0–1.9)
BILIRUB SERPL-MCNC: 0.3 MG/DL (ref 0.1–1)
BODY FLUID SOURCE AMYLASE: NORMAL
BUN SERPL-MCNC: 8 MG/DL (ref 6–20)
CALCIUM SERPL-MCNC: 9.1 MG/DL (ref 8.7–10.5)
CHLORIDE SERPL-SCNC: 107 MMOL/L (ref 95–110)
CO2 SERPL-SCNC: 21 MMOL/L (ref 23–29)
CREAT SERPL-MCNC: 0.9 MG/DL (ref 0.5–1.4)
DIFFERENTIAL METHOD: ABNORMAL
EOSINOPHIL # BLD AUTO: 0.4 K/UL (ref 0–0.5)
EOSINOPHIL NFR BLD: 3.5 % (ref 0–8)
ERYTHROCYTE [DISTWIDTH] IN BLOOD BY AUTOMATED COUNT: 13 % (ref 11.5–14.5)
EST. GFR  (AFRICAN AMERICAN): >60 ML/MIN/1.73 M^2
EST. GFR  (NON AFRICAN AMERICAN): >60 ML/MIN/1.73 M^2
GLUCOSE SERPL-MCNC: 83 MG/DL (ref 70–110)
HCT VFR BLD AUTO: 41.2 % (ref 37–48.5)
HGB BLD-MCNC: 13 G/DL (ref 12–16)
IMM GRANULOCYTES # BLD AUTO: 0.07 K/UL (ref 0–0.04)
IMM GRANULOCYTES NFR BLD AUTO: 0.6 % (ref 0–0.5)
LYMPHOCYTES # BLD AUTO: 1.8 K/UL (ref 1–4.8)
LYMPHOCYTES NFR BLD: 16.2 % (ref 18–48)
MAGNESIUM SERPL-MCNC: 2 MG/DL (ref 1.6–2.6)
MCH RBC QN AUTO: 26.8 PG (ref 27–31)
MCHC RBC AUTO-ENTMCNC: 31.6 G/DL (ref 32–36)
MCV RBC AUTO: 85 FL (ref 82–98)
MONOCYTES # BLD AUTO: 0.9 K/UL (ref 0.3–1)
MONOCYTES NFR BLD: 7.8 % (ref 4–15)
NEUTROPHILS # BLD AUTO: 8.1 K/UL (ref 1.8–7.7)
NEUTROPHILS NFR BLD: 71.7 % (ref 38–73)
NRBC BLD-RTO: 0 /100 WBC
PHOSPHATE SERPL-MCNC: 3.9 MG/DL (ref 2.7–4.5)
PLATELET # BLD AUTO: 246 K/UL (ref 150–350)
PMV BLD AUTO: 10.8 FL (ref 9.2–12.9)
POTASSIUM SERPL-SCNC: 4.2 MMOL/L (ref 3.5–5.1)
PROT SERPL-MCNC: 7.6 G/DL (ref 6–8.4)
RBC # BLD AUTO: 4.85 M/UL (ref 4–5.4)
SODIUM SERPL-SCNC: 137 MMOL/L (ref 136–145)
WBC # BLD AUTO: 11.23 K/UL (ref 3.9–12.7)

## 2020-06-03 PROCEDURE — 97802 MEDICAL NUTRITION INDIV IN: CPT

## 2020-06-03 PROCEDURE — 63600175 PHARM REV CODE 636 W HCPCS: Performed by: FAMILY MEDICINE

## 2020-06-03 PROCEDURE — 94761 N-INVAS EAR/PLS OXIMETRY MLT: CPT

## 2020-06-03 PROCEDURE — 25500020 PHARM REV CODE 255: Performed by: SURGERY

## 2020-06-03 PROCEDURE — 36415 COLL VENOUS BLD VENIPUNCTURE: CPT

## 2020-06-03 PROCEDURE — 82150 ASSAY OF AMYLASE: CPT

## 2020-06-03 PROCEDURE — 11000001 HC ACUTE MED/SURG PRIVATE ROOM

## 2020-06-03 PROCEDURE — 99900035 HC TECH TIME PER 15 MIN (STAT)

## 2020-06-03 PROCEDURE — 80053 COMPREHEN METABOLIC PANEL: CPT

## 2020-06-03 PROCEDURE — 25000003 PHARM REV CODE 250: Performed by: STUDENT IN AN ORGANIZED HEALTH CARE EDUCATION/TRAINING PROGRAM

## 2020-06-03 PROCEDURE — 25000003 PHARM REV CODE 250: Performed by: SURGERY

## 2020-06-03 PROCEDURE — 83735 ASSAY OF MAGNESIUM: CPT

## 2020-06-03 PROCEDURE — 63600175 PHARM REV CODE 636 W HCPCS: Performed by: SURGERY

## 2020-06-03 PROCEDURE — 85025 COMPLETE CBC W/AUTO DIFF WBC: CPT

## 2020-06-03 PROCEDURE — 84100 ASSAY OF PHOSPHORUS: CPT

## 2020-06-03 RX ORDER — POLYETHYLENE GLYCOL 3350 17 G/17G
17 POWDER, FOR SOLUTION ORAL ONCE
Status: COMPLETED | OUTPATIENT
Start: 2020-06-03 | End: 2020-06-03

## 2020-06-03 RX ADMIN — IRON SUCROSE 100 MG: 20 INJECTION, SOLUTION INTRAVENOUS at 11:06

## 2020-06-03 RX ADMIN — OXYCODONE HYDROCHLORIDE AND ACETAMINOPHEN 1000 MG: 500 TABLET ORAL at 09:06

## 2020-06-03 RX ADMIN — PHENYTOIN SODIUM 100 MG: 100 CAPSULE, EXTENDED RELEASE ORAL at 09:06

## 2020-06-03 RX ADMIN — TOPIRAMATE 200 MG: 100 TABLET, FILM COATED ORAL at 09:06

## 2020-06-03 RX ADMIN — PREGABALIN 75 MG: 75 CAPSULE ORAL at 09:06

## 2020-06-03 RX ADMIN — PHENYTOIN SODIUM 100 MG: 100 CAPSULE, EXTENDED RELEASE ORAL at 02:06

## 2020-06-03 RX ADMIN — URSODIOL 300 MG: 300 CAPSULE ORAL at 09:06

## 2020-06-03 RX ADMIN — SIMETHICONE CHEW TAB 80 MG 80 MG: 80 TABLET ORAL at 01:06

## 2020-06-03 RX ADMIN — MORPHINE SULFATE 2 MG: 2 INJECTION, SOLUTION INTRAMUSCULAR; INTRAVENOUS at 10:06

## 2020-06-03 RX ADMIN — MORPHINE SULFATE 2 MG: 2 INJECTION, SOLUTION INTRAMUSCULAR; INTRAVENOUS at 05:06

## 2020-06-03 RX ADMIN — POLYETHYLENE GLYCOL 3350 17 G: 17 POWDER, FOR SOLUTION ORAL at 04:06

## 2020-06-03 RX ADMIN — MORPHINE SULFATE 2 MG: 2 INJECTION, SOLUTION INTRAMUSCULAR; INTRAVENOUS at 12:06

## 2020-06-03 RX ADMIN — DIATRIZOATE MEGLUMINE AND DIATRIZOATE SODIUM 240 ML: 660; 100 LIQUID ORAL; RECTAL at 01:06

## 2020-06-03 RX ADMIN — DEXTROSE MONOHYDRATE, SODIUM CHLORIDE, AND POTASSIUM CHLORIDE: 50; 4.5; 1.49 INJECTION, SOLUTION INTRAVENOUS at 06:06

## 2020-06-03 RX ADMIN — SIMETHICONE CHEW TAB 80 MG 80 MG: 80 TABLET ORAL at 11:06

## 2020-06-03 RX ADMIN — ENOXAPARIN SODIUM 40 MG: 100 INJECTION SUBCUTANEOUS at 04:06

## 2020-06-03 RX ADMIN — ZINC SULFATE 220 MG (50 MG) CAPSULE 220 MG: CAPSULE at 09:06

## 2020-06-03 RX ADMIN — POLYETHYLENE GLYCOL 3350 17 G: 17 POWDER, FOR SOLUTION ORAL at 09:06

## 2020-06-03 RX ADMIN — CYANOCOBALAMIN 1000 MCG: 1000 INJECTION, SOLUTION INTRAMUSCULAR; SUBCUTANEOUS at 09:06

## 2020-06-03 RX ADMIN — VITAMIN E CAP 400 UNIT 400 UNITS: 400 CAP at 09:06

## 2020-06-03 RX ADMIN — MORPHINE SULFATE 2 MG: 2 INJECTION, SOLUTION INTRAMUSCULAR; INTRAVENOUS at 02:06

## 2020-06-03 NOTE — ASSESSMENT & PLAN NOTE
Contributing Nutrition Diagnosis  Inadequate oral intake    Related to (etiology):   S/p surgical interventions    Signs and Symptoms (as evidenced by):   NPO s/p right salpingo oophorectomy, appendectomy, pancreatic and duodenal tumor resection on 5/29     Interventions;   Collaboration with other provider    Nutrition Diagnosis Status:   Improving (on [place] diet)

## 2020-06-03 NOTE — PROGRESS NOTES
"Surgery Progress Note  06/03/2020      Interval HPI  NAEON  Afebrile  NGT with 900 output  Began passing gas yesterday  CAN with 75 serosanguinus output  Pain controlled  Ambulating, using IS    Objective  VITAL SIGNS: 24 HR MIN & MAX LAST    Temp  Min: 98 °F (36.7 °C)  Max: 99.4 °F (37.4 °C)  99.4 °F (37.4 °C)        BP  Min: 115/78  Max: 121/74  121/74     Pulse  Min: 92  Max: 109  109     Resp  Min: 18  Max: 20  20    SpO2  Min: 94 %  Max: 98 %  (!) 94 %      HT: 5' 5" (165.1 cm)  WT: 101.9 kg (224 lb 10.4 oz)  BMI: 37.4     Physical Exam:  Gen: No acute distress  Neuro: Aox3  HEENT: NCAT, EOMI   CV: Regular rate, extremities well perfused   Resp: Normal WOB  Abd: Soft, nondistended, appropriately TTP  CAN with 75 serosanguinous output  MSK: No pedal edema    Results  Recent Labs   Lab 05/30/20  0426 05/31/20  0534 06/01/20  0755 06/02/20  0618   WBC 10.57 9.13 9.32 10.21   HGB 12.3 10.9* 12.0 12.3   HCT 39.2 35.6* 39.5 39.8    178 193 219    136 137 137   CO2 16* 19* 19* 19*   BUN 10 12 5* 5*   CREATININE 0.8 0.8 0.8 0.8   CALCIUM 7.6* 7.9* 8.4* 8.8   MG 2.2 2.0 1.9 2.0   PHOS 2.7 2.1* 2.4* 3.1   ALKPHOS 89 80 88 99       Asessment/Plan  44 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   - NGT clamping trial this afternoon  - continue multi modal pain control  - continue mIVF while NPO  - continue NGT to LIWS for now  - Sandestatin off  - PT, promote IS use and OOB to chair      Ppx: Lov , PPI    Funmi Rocha MD   [employer] General Surgery PGY2      "

## 2020-06-03 NOTE — PLAN OF CARE
Problem: Adult Inpatient Plan of Care  Goal: Plan of Care Review  Outcome: Ongoing, Progressing  Ms. Sacha is resting and medications were given per orders. PRN medication given for PAIN. NG tube in place to LCWS. IV fluids infused. Ambulated to [facility]. No complaints of NV/SOB. Incisions remained CDI, sealed to dermabond. Safety maintained.

## 2020-06-03 NOTE — PT/OT/SLP PROGRESS
Occupational Therapy  Visit Attempt    Patient Name:  Kristan Goncalves   MRN:  [id number]    Patient not seen today secondary to declining OT at this time 2/2 increased pain. Will follow-up when available.    12:56 - Patient SYDNEY in X-ray.     AIDEE Fang  6/3/2020

## 2020-06-03 NOTE — PT/OT/SLP PROGRESS
Physical Therapy      Patient Name:  Kristan Goncalves   MRN:  [id number]    Patient refused am 956 2/2 pain , pm 1305 SYDNEY X ray. Will follow up as able    Musa Mensah PT

## 2020-06-03 NOTE — PLAN OF CARE
Care assumed this AM from ALIDA Corral LPN. Pt remains A+Ox4. Underwent upper GI series, has had BMx2 after returning to floor. This RN spoke with Dr Meyer this PM, NGT clamped per MD directive after miralax administered, CLD for dinner and to check residuals Q6H. Pt tolerated mod amt dinner with no c/o abd pain, discomfort, nausea. Voiding freely, pt c/o some burning with urination this PM, Dr Meyer aware. Medicated for c/o pain per MAR. Safety precautions maintained.

## 2020-06-03 NOTE — PROGRESS NOTES
"[facility]  Adult Nutrition  Progress Note    SUMMARY       Recommendations    Recommendation:   1. Advance diet as medically acceptable, when able consider bland diet (6 small meals)   2. If Pt to remain NPO consider intitation of PPN regimen of Clinimix 4.25/5 at 100 mL/hr to provide 816 kcal, 102 gm pro, 120 gm dex, and 2400 mL fluid. GIR of 0.82. With daily IVFE to supplement calorie needs.   3. Montior triglycerides and electrolytes while infusing.   4. When PO intake initiated provide Impact AR 3x/day for 5 days.     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 Pt with ZES s/p right salpingo oophorectomy, appendectomy, pancreatic and duodenal tumor resection on 5/29. Pt reports no appetite as of current, Denies N/V. Recieving IVF at 100 mL/hr. NGT for suction in place. NFPE completed today 6/3; pt appears nourished at this time  Nutrition Discharge Planning: d/c to be determined    Nutrition Risk Screen    Nutrition Risk Screen: no indicators present    Nutrition/Diet History    Food Preferences: no cultural or spiritual preferences identified  Spiritual, Cultural Beliefs, Roman Catholic Practices, Values that Affect Care: no  Food Allergies: NKFA  Factors Affecting Nutritional Intake: NPO    Anthropometrics    Temp: 98.4 °F (36.9 °C)  Height Method: Stated  Height: 5' 5" (165.1 cm)  Height (inches): 65 in  Weight Method: Bed Scale  Weight: 101.9 kg (224 lb 10.4 oz)  Weight (lb): 224.65 lb  Ideal Body Weight (IBW), Female: 125 lb  % Ideal Body Weight, Female (lb): 179.72 %  BMI (Calculated): " 37.4  BMI Grade: 35 - 39.9 - obesity - grade II       Lab/Procedures/Meds    Pertinent Labs Reviewed: reviewed  Pertinent Labs Comments: CO2 21  Pertinent Medications Reviewed: reviewed  Pertinent Medications Comments: vit C, cyanocolbalamin, enoxaparin, ergocalciferol, venofer, dilantin, pregabalin, topiramate, ursodiol, Vit E, D5 with NaCl and KCl    Physical Findings/Assessment    Vasile Score: 20  Incision/Site   Abdomen    Estimated/Assessed Needs    Weight Used For Calorie Calculations: 101.9 kg (224 lb 10.4 oz)  Energy Calorie Requirements (kcal): 1991  Energy Need Method: Venus-St Jeor(x 1.2)  Protein Requirements: 102-123(1.0-1.2 gm/kg)  Weight Used For Protein Calculations: 101.9 kg (224 lb 10.4 oz)     Estimated Fluid Requirement Method: RDA Method(or PER MD)  RDA Method (mL): 1991         Nutrition Prescription Ordered    Current Diet Order: NPO    Evaluation of Received Nutrient/Fluid Intake    Total Calories (kcal): 408(IVFE)  % Kcal Needs: 21  IV Fluid (mL): 2400(D5 with NaCl and KCl)  I/O: 2493.3/2005  Energy Calories Required: not meeting needs  Protein Required: not meeting needs  Fluid Required: meeting needs  Comments: LBM 5/29  Tolerance: tolerating  % Intake of Estimated Energy Needs: 0 - 25 %  % Meal Intake: NPO    Nutrition Risk    Level of Risk/Frequency of Follow-up: (2 x/week)     Assessment and Plan    Carcinoid tumor of stomach  Contributing Nutrition Diagnosis  Inadequate oral intake    Related to (etiology):   S/p surgical interventions    Signs and Symptoms (as evidenced by):   NPO s/p right salpingo oophorectomy, appendectomy, pancreatic and duodenal tumor resection on 5/29     Interventions;   Collaboration with other provider  Enteral Nutrition   Commercial Beverage    Nutrition Diagnosis Status:   New           Monitor and Evaluation    Food and Nutrient Intake: energy intake, food and beverage intake, parenteral nutrition intake  Food and Nutrient Adminstration: diet order,  enteral and parenteral nutrition administration  Knowledge/Beliefs/Attitudes: food and nutrition knowledge/skill  Physical Activity and Function: nutrition-related ADLs and IADLs  Anthropometric Measurements: weight change, weight, body mass index  Biochemical Data, Medical Tests and Procedures: electrolyte and renal panel, gastrointestinal profile, glucose/endocrine profile, inflammatory profile, lipid profile  Nutrition-Focused Physical Findings: overall appearance     Malnutrition Assessment                 Orbital Region (Subcutaneous Fat Loss): well nourished  Upper Arm Region (Subcutaneous Fat Loss): well nourished  Thoracic and Lumbar Region: well nourished   [place]ondoism Region (Muscle Loss): well nourished  Clavicle Bone Region (Muscle Loss): well nourished  Clavicle and Acromion Bone Region (Muscle Loss): well nourished  Scapular Bone Region (Muscle Loss): well nourished  Dorsal Hand (Muscle Loss): well nourished       Subcutaneous Fat Loss (Final Summary): well nourished  Muscle Loss Evaluation (Final Summary): well nourished         Nutrition Follow-Up    RD Follow-up?: Yes

## 2020-06-03 NOTE — PLAN OF CARE
Recommendation:   1. Advance diet as medically acceptable, when able consider bland diet (6 small meals)   2. If Pt to remain NPO consider intitation of PPN regimen of Clinimix 4.25/5 at 100 mL/hr to provide 816 kcal, 102 gm pro, 120 gm dex, and 2400 mL fluid. GIR of 0.82. With daily IVFE to supplement calorie needs.   3. Montior triglycerides and electrolytes while infusing.   4. When PO intake initiated provide Impact AR 3x/day for 5 days.     Goals:   Pt to recieve nutrition by RD follow up    Nutrition Goal Status: new  Communication of RD Recs: other (comment)(POC)      Problem: Oral Intake Inadequate  Goal: Improved Oral Intake  Outcome: Ongoing, Progressing

## 2020-06-04 LAB
ALBUMIN SERPL BCP-MCNC: 3.3 G/DL (ref 3.5–5.2)
ALP SERPL-CCNC: 130 U/L (ref 55–135)
ALT SERPL W/O P-5'-P-CCNC: 37 U/L (ref 10–44)
ANION GAP SERPL CALC-SCNC: 10 MMOL/L (ref 8–16)
AST SERPL-CCNC: 27 U/L (ref 10–40)
BASOPHILS # BLD AUTO: 0.02 K/UL (ref 0–0.2)
BASOPHILS NFR BLD: 0.2 % (ref 0–1.9)
BILIRUB SERPL-MCNC: 0.3 MG/DL (ref 0.1–1)
BUN SERPL-MCNC: 11 MG/DL (ref 6–20)
CALCIUM SERPL-MCNC: 9 MG/DL (ref 8.7–10.5)
CHLORIDE SERPL-SCNC: 110 MMOL/L (ref 95–110)
CO2 SERPL-SCNC: 19 MMOL/L (ref 23–29)
CREAT SERPL-MCNC: 0.8 MG/DL (ref 0.5–1.4)
DIFFERENTIAL METHOD: ABNORMAL
EOSINOPHIL # BLD AUTO: 0.5 K/UL (ref 0–0.5)
EOSINOPHIL NFR BLD: 5.4 % (ref 0–8)
ERYTHROCYTE [DISTWIDTH] IN BLOOD BY AUTOMATED COUNT: 13.1 % (ref 11.5–14.5)
EST. GFR  (AFRICAN AMERICAN): >60 ML/MIN/1.73 M^2
EST. GFR  (NON AFRICAN AMERICAN): >60 ML/MIN/1.73 M^2
FINAL PATHOLOGIC DIAGNOSIS: NORMAL
FROZEN SECTION DIAGNOSIS: NORMAL
FROZEN SECTION FOOTNOTE: NORMAL
GLUCOSE SERPL-MCNC: 91 MG/DL (ref 70–110)
GROSS: NORMAL
HCT VFR BLD AUTO: 39.3 % (ref 37–48.5)
HGB BLD-MCNC: 12.3 G/DL (ref 12–16)
IMM GRANULOCYTES # BLD AUTO: 0.06 K/UL (ref 0–0.04)
IMM GRANULOCYTES NFR BLD AUTO: 0.7 % (ref 0–0.5)
LYMPHOCYTES # BLD AUTO: 2.1 K/UL (ref 1–4.8)
LYMPHOCYTES NFR BLD: 22.9 % (ref 18–48)
MAGNESIUM SERPL-MCNC: 2 MG/DL (ref 1.6–2.6)
MCH RBC QN AUTO: 27.1 PG (ref 27–31)
MCHC RBC AUTO-ENTMCNC: 31.3 G/DL (ref 32–36)
MCV RBC AUTO: 87 FL (ref 82–98)
MONOCYTES # BLD AUTO: 0.8 K/UL (ref 0.3–1)
MONOCYTES NFR BLD: 9 % (ref 4–15)
NEUTROPHILS # BLD AUTO: 5.7 K/UL (ref 1.8–7.7)
NEUTROPHILS NFR BLD: 61.8 % (ref 38–73)
NRBC BLD-RTO: 0 /100 WBC
PHOSPHATE SERPL-MCNC: 3.8 MG/DL (ref 2.7–4.5)
PLATELET # BLD AUTO: 242 K/UL (ref 150–350)
PMV BLD AUTO: 10.7 FL (ref 9.2–12.9)
POTASSIUM SERPL-SCNC: 3.9 MMOL/L (ref 3.5–5.1)
PROT SERPL-MCNC: 7.4 G/DL (ref 6–8.4)
RBC # BLD AUTO: 4.54 M/UL (ref 4–5.4)
SODIUM SERPL-SCNC: 139 MMOL/L (ref 136–145)
WBC # BLD AUTO: 9.14 K/UL (ref 3.9–12.7)

## 2020-06-04 PROCEDURE — 63600175 PHARM REV CODE 636 W HCPCS: Performed by: SURGERY

## 2020-06-04 PROCEDURE — 84100 ASSAY OF PHOSPHORUS: CPT

## 2020-06-04 PROCEDURE — 94761 N-INVAS EAR/PLS OXIMETRY MLT: CPT

## 2020-06-04 PROCEDURE — 97110 THERAPEUTIC EXERCISES: CPT | Mod: CQ

## 2020-06-04 PROCEDURE — 63600175 PHARM REV CODE 636 W HCPCS: Performed by: FAMILY MEDICINE

## 2020-06-04 PROCEDURE — 85025 COMPLETE CBC W/AUTO DIFF WBC: CPT

## 2020-06-04 PROCEDURE — 83735 ASSAY OF MAGNESIUM: CPT

## 2020-06-04 PROCEDURE — 25000003 PHARM REV CODE 250: Performed by: SURGERY

## 2020-06-04 PROCEDURE — 11000001 HC ACUTE MED/SURG PRIVATE ROOM

## 2020-06-04 PROCEDURE — 36415 COLL VENOUS BLD VENIPUNCTURE: CPT

## 2020-06-04 PROCEDURE — 80053 COMPREHEN METABOLIC PANEL: CPT

## 2020-06-04 PROCEDURE — 97116 GAIT TRAINING THERAPY: CPT | Mod: CQ

## 2020-06-04 PROCEDURE — 97530 THERAPEUTIC ACTIVITIES: CPT | Mod: CO

## 2020-06-04 PROCEDURE — 25000003 PHARM REV CODE 250: Performed by: STUDENT IN AN ORGANIZED HEALTH CARE EDUCATION/TRAINING PROGRAM

## 2020-06-04 RX ORDER — AMOXICILLIN 250 MG
1 CAPSULE ORAL 2 TIMES DAILY
Status: DISCONTINUED | OUTPATIENT
Start: 2020-06-04 | End: 2020-06-05 | Stop reason: HOSPADM

## 2020-06-04 RX ADMIN — VITAMIN E CAP 400 UNIT 400 UNITS: 400 CAP at 08:06

## 2020-06-04 RX ADMIN — ENOXAPARIN SODIUM 40 MG: 100 INJECTION SUBCUTANEOUS at 04:06

## 2020-06-04 RX ADMIN — PREGABALIN 75 MG: 75 CAPSULE ORAL at 09:06

## 2020-06-04 RX ADMIN — DEXTROSE MONOHYDRATE, SODIUM CHLORIDE, AND POTASSIUM CHLORIDE: 50; 4.5; 1.49 INJECTION, SOLUTION INTRAVENOUS at 04:06

## 2020-06-04 RX ADMIN — PHENYTOIN SODIUM 100 MG: 100 CAPSULE, EXTENDED RELEASE ORAL at 09:06

## 2020-06-04 RX ADMIN — OXYCODONE HYDROCHLORIDE AND ACETAMINOPHEN 1000 MG: 500 TABLET ORAL at 09:06

## 2020-06-04 RX ADMIN — SIMETHICONE CHEW TAB 80 MG 80 MG: 80 TABLET ORAL at 10:06

## 2020-06-04 RX ADMIN — PHENYTOIN SODIUM 100 MG: 100 CAPSULE, EXTENDED RELEASE ORAL at 03:06

## 2020-06-04 RX ADMIN — TOPIRAMATE 200 MG: 100 TABLET, FILM COATED ORAL at 09:06

## 2020-06-04 RX ADMIN — URSODIOL 300 MG: 300 CAPSULE ORAL at 08:06

## 2020-06-04 RX ADMIN — MORPHINE SULFATE 2 MG: 2 INJECTION, SOLUTION INTRAMUSCULAR; INTRAVENOUS at 10:06

## 2020-06-04 RX ADMIN — PHENYTOIN SODIUM 100 MG: 100 CAPSULE, EXTENDED RELEASE ORAL at 08:06

## 2020-06-04 RX ADMIN — TOPIRAMATE 200 MG: 100 TABLET, FILM COATED ORAL at 08:06

## 2020-06-04 RX ADMIN — URSODIOL 300 MG: 300 CAPSULE ORAL at 09:06

## 2020-06-04 RX ADMIN — ZINC SULFATE 220 MG (50 MG) CAPSULE 220 MG: CAPSULE at 08:06

## 2020-06-04 RX ADMIN — PREGABALIN 75 MG: 75 CAPSULE ORAL at 08:06

## 2020-06-04 RX ADMIN — CYANOCOBALAMIN 1000 MCG: 1000 INJECTION, SOLUTION INTRAMUSCULAR; SUBCUTANEOUS at 08:06

## 2020-06-04 RX ADMIN — OXYCODONE HYDROCHLORIDE AND ACETAMINOPHEN 1000 MG: 500 TABLET ORAL at 08:06

## 2020-06-04 RX ADMIN — IRON SUCROSE 100 MG: 20 INJECTION, SOLUTION INTRAVENOUS at 08:06

## 2020-06-04 RX ADMIN — MORPHINE SULFATE 2 MG: 2 INJECTION, SOLUTION INTRAMUSCULAR; INTRAVENOUS at 05:06

## 2020-06-04 RX ADMIN — SIMETHICONE CHEW TAB 80 MG 80 MG: 80 TABLET ORAL at 05:06

## 2020-06-04 NOTE — PLAN OF CARE
Problem: Physical Therapy Goal  Goal: Physical Therapy Goal  Description  Goals to be met by: 20     Patient will increase functional independence with mobility by performin. Supine <> sit with Stand-by Assistance  2. Sit to stand transfer with Supervision  MET 20  3. Bed to chair transfer with Supervision    4. Gait  x 150 feet with Supervision  MET 20  5. Lower extremity exercise program x 10 reps per handout, with supervision      Outcome: Ongoing, Progressing   Goals 2 and 4 met

## 2020-06-04 NOTE — PLAN OF CARE
Problem: Occupational Therapy Goal  Goal: Occupational Therapy Goal  Description  Goals to be met by: 6/30/2020    Patient will increase functional independence with ADLs by performing:    UE Dressing with Modified [place].  LE Dressing with Modified [place]/Supervision.  Grooming while standing at sink with Modified [place].  Toileting from toilet with Modified [place] for hygiene and clothing management.   Toilet transfer to toilet with Modified [place].     Outcome: Ongoing, Progressing     Patient making great progress towards goals. Somewhat stiff gait, but /c VCs improved. Patient will benefit from continued skilled OT to address deficits and improve performance in functional ADL tasks. Cont OT.

## 2020-06-04 NOTE — PT/OT/SLP PROGRESS
Occupational Therapy   Treatment    Name: Kristan Goncalves  MRN: [id number]  Admitting Diagnosis:  Duodenal carcinoid syndrome  6 Days Post-Op    Recommendations:     Discharge Recommendations: home  Discharge Equipment Recommendations:  walker, rolling  Barriers to discharge:  None    Assessment:   Patient making great progress towards goals. Somewhat stiff gait, but /c VCs improved. Patient will benefit from continued skilled OT to address deficits and improve performance in functional ADL tasks. Cont OT.     Kristan Goncalves is a 44 y.o. female with a medical diagnosis of Duodenal carcinoid syndrome. Performance deficits affecting function are weakness, impaired endurance, impaired self care skills, impaired functional mobilty, gait instability, impaired balance, decreased upper extremity function, decreased lower extremity function, decreased ROM.     Rehab Prognosis:  Good; patient would benefit from acute skilled OT services to address these deficits and reach maximum level of function.       Plan:     Patient to be seen 5 x/week to address the above listed problems via self-care/home management, therapeutic activities, therapeutic exercises  · Plan of Care Expires: 06/30/20  · Plan of Care Reviewed with: patient    Subjective     Pain/Comfort:  · Pain Rating 1: 4/10  · Location - Side 1: Bilateral    Objective:     Communicated with: nurseGisella prior to session.  Patient found seated EOB with peripheral IV upon OT entry to room.    General Precautions: Standard, fall   Orthopedic Precautions:N/A   Braces: N/A     Bed Mobility:    · N/A    Functional Mobility/Transfers:  · Patient completed Sit <> Stand Transfer with stand by assistance  with no assistive device  · Patient completed Bed <> Chair Transfer using Step Transfer technique with stand by assistance and contact guard assistance with no assistive device    Activities of Daily Living:  · N/A    [facility] 6 Click ADL: 18    Treatment &  Education:  Patient /c improved activity tolerance and participation this date. Minimal c/o pain in abd. Patient /c stiff gait during ambulation, but improved /c VCs.     Patient left up in chair with all lines intact, call button in reach and nsg notifiedEducation:      GOALS:   Multidisciplinary Problems     Occupational Therapy Goals        Problem: Occupational Therapy Goal    Goal Priority Disciplines Outcome Interventions   Occupational Therapy Goal     OT, PT/OT Ongoing, Progressing    Description:  Goals to be met by: 6/30/2020    Patient will increase functional independence with ADLs by performing:    UE Dressing with Modified [place].  LE Dressing with Modified [place]/Supervision.  Grooming while standing at sink with Modified [place].  Toileting from toilet with Modified [place] for hygiene and clothing management.   Toilet transfer to toilet with Modified [place].                      Time Tracking:     OT Date of Treatment: 06/04/20  OT Start Time: 1350  OT Stop Time: 1406  OT Total Time (min): 16 min    Billable Minutes:Therapeutic Activity 16    AIDEE Fang  6/4/2020

## 2020-06-04 NOTE — PT/OT/SLP PROGRESS
Physical Therapy Treatment    Patient Name:  Kristan Goncalves   MRN:  [id number]    Recommendations:     Discharge Recommendations:  home   Discharge Equipment Recommendations: walker, rolling   Barriers to discharge: decreased endurance and strength    Assessment:     Kristan Goncalves is a 44 y.o. female admitted with a medical diagnosis of Duodenal carcinoid syndrome.  She presents with the following impairments/functional limitations:  weakness, impaired endurance, impaired functional mobilty, impaired balance, pain, decreased ROM, impaired coordination, impaired skin,pt with improving status and requires a RW at this time with gait,progress as tolerated,possible HH services pending progress.    Rehab Prognosis: Good; patient would benefit from acute skilled PT services to address these deficits and reach maximum level of function.    Recent Surgery: Procedure(s) (LRB):  DUODENECTOMY ROBOTIC (N/A)  OOPHORECTOMY (Right)  ROBOTIC APPENDECTOMY (N/A)  REMOVAL cyst (Right)  LAPAROTOMY, EXPLORATORY, DUODENAL TUMOR RESECTION, PANCREATIC TUMOR RESECTION, LIVER ULTRASOUND 6 Days Post-Op    Plan:     During this hospitalization, patient to be seen 6 x/week to address the identified rehab impairments via gait training, therapeutic exercises, therapeutic activities and progress toward the following goals:    · Plan of Care Expires:  06/30/20    Subjective     Chief Complaint: n/a  Patient/Family Comments/goals: pt glad to have tube out of nose.  Pain/Comfort:  · Pain Rating 1: 3/10  · Location - Orientation 1: generalized  · Location 1: abdomen  · Pain Addressed 1: Reposition, Distraction  · Pain Rating Post-Intervention 1: 4/10      Objective:     Communicated with nsg prior to session.  Patient found up in chair with   upon PT entry to room.     General Precautions: Standard, fall   Orthopedic Precautions:N/A   Braces: N/A     Functional Mobility:  · Transfers:     · Sit to Stand:  supervision with rolling  walker  · Gait: amb ~225' X 1 with RW and S  · Balance: fair standing balance with RW      AM-PAC 6 CLICK MOBILITY  Turning over in bed (including adjusting bedclothes, sheets and blankets)?: 3  Sitting down on and standing up from a chair with arms (e.g., wheelchair, bedside commode, etc.): 3  Moving from lying on back to sitting on the side of the bed?: 3  Moving to and from a bed to a chair (including a wheelchair)?: 3  Need to walk in hospital room?: 3  Climbing 3-5 steps with a railing?: 2  Basic Mobility Total Score: 17       Therapeutic Activities and Exercises: le seated ex's X 10-12 reps inc: ap,laq,hip flex,abd/add.       Patient left up in chair with all lines intact, call button in reach and nsg notified..    GOALS: see general POC  Multidisciplinary Problems     Physical Therapy Goals        Problem: Physical Therapy Goal    Goal Priority Disciplines Outcome Goal Variances Interventions   Physical Therapy Goal     PT, PT/OT Ongoing, Progressing     Description:  Goals to be met by: 20     Patient will increase functional independence with mobility by performin. Supine <> sit with Stand-by Assistance  2. Sit to stand transfer with Supervision  MET 20  3. Bed to chair transfer with Supervision    4. Gait  x 150 feet with Supervision  MET 20  5. Lower extremity exercise program x 10 reps per handout, with supervision                       Time Tracking:     PT Received On: 20  PT Start Time: 1432     PT Stop Time: 1456  PT Total Time (min): 24 min     Billable Minutes: Gait Training 14 and Therapeutic Exercise 10    Treatment Type: Treatment  PT/PTA: PTA     PTA Visit Number: 1     Enrique Handy, DEE  2020

## 2020-06-04 NOTE — PLAN OF CARE
Pt awake and alert. No complaints of pain and nausea. NG tube removed today. Pt tolerated CLD. Pt OOB into the chair and walking around the room.

## 2020-06-04 NOTE — PROGRESS NOTES
"Surgery Progress Note  06/04/2020      Interval HPI  NAEON  UGi yesterday without evidence of leak  bm x 2 overnight  Pain improving, controlled with meds    Objective  VITAL SIGNS: 24 HR MIN & MAX LAST    Temp  Min: 98.3 °F (36.8 °C)  Max: 99 °F (37.2 °C)  98.8 °F (37.1 °C)        BP  Min: 107/71  Max: 154/69  118/76     Pulse  Min: 91  Max: 107  95     Resp  Min: 16  Max: 20  17    SpO2  Min: 96 %  Max: 97 %  97 %      HT: 5' 5" (165.1 cm)  WT: 100.6 kg (221 lb 12.5 oz)  BMI: 36.9     Physical Exam:  Gen: No acute distress  Neuro: Aox3  HEENT: NCAT, EOMI   CV: Regular rate, extremities well perfused   Resp: Normal WOB  Abd: Soft, nondistended, appropriately TTP  CAN with  serosanguinous output 70  MSK: No pedal edema    Results  Recent Labs   Lab 05/31/20  0534 06/01/20  0755 06/02/20  0618 06/03/20  0810 06/03/20  0958   WBC 9.13 9.32 10.21  --  11.23   HGB 10.9* 12.0 12.3  --  13.0   HCT 35.6* 39.5 39.8  --  41.2    193 219  --  246    137 137 137  --    CO2 19* 19* 19* 21*  --    BUN 12 5* 5* 8  --    CREATININE 0.8 0.8 0.8 0.9  --    CALCIUM 7.9* 8.4* 8.8 9.1  --    MG 2.0 1.9 2.0 2.0  --    PHOS 2.1* 2.4* 3.1 3.9  --    ALKPHOS 80 88 99 114  --        Asessment/Plan  44 y.o. female with ZES s/p right salpingo oophorectomy, appendectomy, pancreatic and duodenal tumor resection on 5/29     - discontinue NG (duodenal) tube today  - CLD  - continue multi modal pain control  - continue mIVF until good PO intake  - PT, promote IS use and OOB to chair      Ppx: Lov , PPI    Funmi Rocha MD   [employer] General Surgery PGY2      "

## 2020-06-05 ENCOUNTER — TELEPHONE (OUTPATIENT)
Dept: NEUROLOGY | Facility: HOSPITAL | Age: 45
End: 2020-06-05

## 2020-06-05 VITALS
WEIGHT: 221.81 LBS | OXYGEN SATURATION: 96 % | HEART RATE: 91 BPM | TEMPERATURE: 99 F | BODY MASS INDEX: 36.96 KG/M2 | SYSTOLIC BLOOD PRESSURE: 119 MMHG | RESPIRATION RATE: 20 BRPM | HEIGHT: 65 IN | DIASTOLIC BLOOD PRESSURE: 74 MMHG

## 2020-06-05 LAB
ALBUMIN SERPL BCP-MCNC: 3.4 G/DL (ref 3.5–5.2)
ALP SERPL-CCNC: 114 U/L (ref 55–135)
ALT SERPL W/O P-5'-P-CCNC: 41 U/L (ref 10–44)
ANION GAP SERPL CALC-SCNC: 11 MMOL/L (ref 8–16)
AST SERPL-CCNC: 28 U/L (ref 10–40)
BASOPHILS # BLD AUTO: 0.02 K/UL (ref 0–0.2)
BASOPHILS NFR BLD: 0.3 % (ref 0–1.9)
BILIRUB SERPL-MCNC: 0.3 MG/DL (ref 0.1–1)
BUN SERPL-MCNC: 11 MG/DL (ref 6–20)
CALCIUM SERPL-MCNC: 8.6 MG/DL (ref 8.7–10.5)
CHLORIDE SERPL-SCNC: 108 MMOL/L (ref 95–110)
CO2 SERPL-SCNC: 19 MMOL/L (ref 23–29)
CREAT SERPL-MCNC: 0.7 MG/DL (ref 0.5–1.4)
DIFFERENTIAL METHOD: ABNORMAL
EOSINOPHIL # BLD AUTO: 0.5 K/UL (ref 0–0.5)
EOSINOPHIL NFR BLD: 6.4 % (ref 0–8)
ERYTHROCYTE [DISTWIDTH] IN BLOOD BY AUTOMATED COUNT: 12.8 % (ref 11.5–14.5)
EST. GFR  (AFRICAN AMERICAN): >60 ML/MIN/1.73 M^2
EST. GFR  (NON AFRICAN AMERICAN): >60 ML/MIN/1.73 M^2
GLUCOSE SERPL-MCNC: 78 MG/DL (ref 70–110)
HCT VFR BLD AUTO: 37.7 % (ref 37–48.5)
HGB BLD-MCNC: 12 G/DL (ref 12–16)
IMM GRANULOCYTES # BLD AUTO: 0.09 K/UL (ref 0–0.04)
IMM GRANULOCYTES NFR BLD AUTO: 1.2 % (ref 0–0.5)
LYMPHOCYTES # BLD AUTO: 1.9 K/UL (ref 1–4.8)
LYMPHOCYTES NFR BLD: 24.9 % (ref 18–48)
MAGNESIUM SERPL-MCNC: 1.8 MG/DL (ref 1.6–2.6)
MCH RBC QN AUTO: 27 PG (ref 27–31)
MCHC RBC AUTO-ENTMCNC: 31.8 G/DL (ref 32–36)
MCV RBC AUTO: 85 FL (ref 82–98)
MONOCYTES # BLD AUTO: 0.7 K/UL (ref 0.3–1)
MONOCYTES NFR BLD: 9.6 % (ref 4–15)
NEUTROPHILS # BLD AUTO: 4.3 K/UL (ref 1.8–7.7)
NEUTROPHILS NFR BLD: 57.6 % (ref 38–73)
NRBC BLD-RTO: 0 /100 WBC
PHOSPHATE SERPL-MCNC: 4.4 MG/DL (ref 2.7–4.5)
PLATELET # BLD AUTO: 237 K/UL (ref 150–350)
PMV BLD AUTO: 10.5 FL (ref 9.2–12.9)
POTASSIUM SERPL-SCNC: 4.3 MMOL/L (ref 3.5–5.1)
PROT SERPL-MCNC: 7 G/DL (ref 6–8.4)
RBC # BLD AUTO: 4.44 M/UL (ref 4–5.4)
SODIUM SERPL-SCNC: 138 MMOL/L (ref 136–145)
WBC # BLD AUTO: 7.48 K/UL (ref 3.9–12.7)

## 2020-06-05 PROCEDURE — 83735 ASSAY OF MAGNESIUM: CPT

## 2020-06-05 PROCEDURE — 97116 GAIT TRAINING THERAPY: CPT | Mod: CQ

## 2020-06-05 PROCEDURE — 25000003 PHARM REV CODE 250: Performed by: STUDENT IN AN ORGANIZED HEALTH CARE EDUCATION/TRAINING PROGRAM

## 2020-06-05 PROCEDURE — 97110 THERAPEUTIC EXERCISES: CPT | Mod: CQ

## 2020-06-05 PROCEDURE — 84100 ASSAY OF PHOSPHORUS: CPT

## 2020-06-05 PROCEDURE — 80053 COMPREHEN METABOLIC PANEL: CPT

## 2020-06-05 PROCEDURE — 36415 COLL VENOUS BLD VENIPUNCTURE: CPT

## 2020-06-05 PROCEDURE — 85025 COMPLETE CBC W/AUTO DIFF WBC: CPT

## 2020-06-05 PROCEDURE — 97803 MED NUTRITION INDIV SUBSEQ: CPT

## 2020-06-05 PROCEDURE — 94761 N-INVAS EAR/PLS OXIMETRY MLT: CPT

## 2020-06-05 PROCEDURE — 25000003 PHARM REV CODE 250: Performed by: SURGERY

## 2020-06-05 PROCEDURE — 63600175 PHARM REV CODE 636 W HCPCS: Performed by: SURGERY

## 2020-06-05 RX ORDER — TRAMADOL HYDROCHLORIDE 50 MG/1
50 TABLET ORAL EVERY 6 HOURS PRN
Qty: 14 TABLET | Refills: 0 | Status: SHIPPED | OUTPATIENT
Start: 2020-06-05

## 2020-06-05 RX ORDER — AMOXICILLIN 250 MG
1 CAPSULE ORAL 2 TIMES DAILY
Qty: 30 TABLET | Refills: 0 | Status: SHIPPED | OUTPATIENT
Start: 2020-06-05

## 2020-06-05 RX ADMIN — CYANOCOBALAMIN 1000 MCG: 1000 INJECTION, SOLUTION INTRAMUSCULAR; SUBCUTANEOUS at 09:06

## 2020-06-05 RX ADMIN — OXYCODONE HYDROCHLORIDE AND ACETAMINOPHEN 1000 MG: 500 TABLET ORAL at 09:06

## 2020-06-05 RX ADMIN — PREGABALIN 75 MG: 75 CAPSULE ORAL at 09:06

## 2020-06-05 RX ADMIN — VITAMIN E CAP 400 UNIT 400 UNITS: 400 CAP at 09:06

## 2020-06-05 RX ADMIN — STANDARDIZED SENNA CONCENTRATE AND DOCUSATE SODIUM 1 TABLET: 8.6; 5 TABLET ORAL at 09:06

## 2020-06-05 RX ADMIN — URSODIOL 300 MG: 300 CAPSULE ORAL at 09:06

## 2020-06-05 RX ADMIN — IRON SUCROSE 100 MG: 20 INJECTION, SOLUTION INTRAVENOUS at 09:06

## 2020-06-05 RX ADMIN — TOPIRAMATE 200 MG: 100 TABLET, FILM COATED ORAL at 09:06

## 2020-06-05 RX ADMIN — SIMETHICONE CHEW TAB 80 MG 80 MG: 80 TABLET ORAL at 09:06

## 2020-06-05 RX ADMIN — PHENYTOIN SODIUM 100 MG: 100 CAPSULE, EXTENDED RELEASE ORAL at 09:06

## 2020-06-05 RX ADMIN — ZINC SULFATE 220 MG (50 MG) CAPSULE 220 MG: CAPSULE at 10:06

## 2020-06-05 NOTE — PLAN OF CARE
Recommendation:   1. Encourage intake at meals as tolerated.   2. Monitor need for supplements.   3. RD to continue to follow to monitor po intake/diet tolerance    Goals:   Pt will tolerate diet with at least 50-75% intake at meals  Nutrition Goal Status: new  Communication of RD Recs: reviewed with RN

## 2020-06-05 NOTE — PLAN OF CARE
Problem: Physical Therapy Goal  Goal: Physical Therapy Goal  Description  Goals to be met by: 20     Patient will increase functional independence with mobility by performin. Supine <> sit with Stand-by Assistance  MET 20  2. Sit to stand transfer with Supervision  MET 20  3. Bed to chair transfer with Supervision  MET 20  4. Gait  x 150 feet with Supervision  MET 20  5. Lower extremity exercise program x 10 reps per handout, with supervision       Outcome: Ongoing, Progressing   Goals 1 and 3 met

## 2020-06-05 NOTE — PLAN OF CARE
Discharge order noted, No HH or DME noted.    Discharge rounds on patient. Discussed followup appointments, blue discharge folder, discharge nurse will go over home medications and reasons for medications and final discharge instructions. All patient/caregiver questions answered. Patient verbalized understanding.    Follow-up With  Details  Why  Contact Info   Ericka Zaragoza MD  In 2 weeks  's office staff will contact you to schedule a follow up appointment in the next 1 to 2 business days   [address] [facility]  [address]  [facility] [address]  [phone]          06/05/20 1105   Final Note   Assessment Type Final Discharge Note   Anticipated Discharge Disposition Home   What phone number can be called within the next 1-3 days to see how you are doing after discharge? [phone]   Hospital Follow Up  Appt(s) scheduled? Yes   Discharge plans and expectations educations in teach back method with documentation complete? Yes   Right Care Referral Info   Post Acute Recommendation No Care

## 2020-06-05 NOTE — PLAN OF CARE
Problem: Adult Inpatient Plan of Care  Goal: Plan of Care Review  Outcome: Ongoing, Progressing   Chart Reviewed

## 2020-06-05 NOTE — DISCHARGE SUMMARY
[facility] Neuroendocrine Surgery/General Surgery  Discharge Summary    Admit Date:   5/29/2020    Discharge Date:   6/5/2020    Attending Physician:   Ericka Zaragoza MD    Consults:   none    Procedures Performed:   Robotic assisted:  Lysis of adhesions  Appendectomy  Pancreatic tumor resection  Duodenostomy and closure     Admission HPI:   44 yoF with Zollinger Lambert syndrome presented for resection of her pancreatic NET.    Hospital Course:   Post operatively she was transferred to the ICU on a somatostatin drip that was weaned over the next few days. Post anastamotic NG tube was kept in place for decompression. UGI series on POD5 showed no evidence of leak, the patient was having bowel function and the NGT was removed. Her diet was slowly advanced to FLD as tolerated. CAN drain showed no bilious output and was removed prior to discharge.      Final Diagnoses:  Active Hospital Problems    Diagnosis  POA    *Duodenal carcinoid syndrome [E34.0]  Yes    Carcinoid tumor of stomach [D3A.092]  Yes    Zollinger-Lambert syndrome [E16.4]  Yes    Esophageal dysphagia [R13.10]  Yes      Resolved Hospital Problems   No resolved problems to display.     Discharged Condition:   stable    Disposition:    Home or Self Care    Discharge Instructions:   Discharge Procedure Orders   Diet full liquid     Lifting restrictions   Order Comments: No lifting >15 lbs     Notify your health care provider if you experience any of the following:  temperature >100.4     Notify your health care provider if you experience any of the following:  persistent nausea and vomiting or diarrhea     Notify your health care provider if you experience any of the following:  severe uncontrolled pain     Notify your health care provider if you experience any of the following:  redness, tenderness, or signs of infection (pain, swelling, redness, odor or green/yellow discharge around incision site)     Notify your health care provider if you experience  any of the following:  difficulty breathing or increased cough     Notify your health care provider if you experience any of the following:  increased confusion or weakness     No dressing needed   Order Comments: There is surgical glue to incisions, this will peel off over the next few weeks. OK to shower. Band aid PRN over drain site.     Activity as tolerated     Follow-up Information     Ericka Zaragoza MD In 2 weeks.    Specialty:  General Surgery  Contact information:  [address] [facility] [address]  [address]  [place] [id number] [phone]                 Current Discharge Medication List      START taking these medications    Details   senna-docusate 8.6-50 mg (PERICOLACE) 8.6-50 mg per tablet Take 1 tablet by mouth 2 (two) times daily. While on pain medication  Qty: 30 tablet, Refills: 0      traMADoL (ULTRAM) 50 mg tablet Take 1 tablet (50 mg total) by mouth every 6 (six) hours as needed for Pain.  Qty: 14 tablet, Refills: 0    Comments: n/a          CONTINUE these medications which have NOT CHANGED    Details   pantoprazole (PROTONIX) 40 MG tablet Take 1 tablet (40 mg total) by mouth once daily.  Qty: 30 tablet, Refills: 0      phenytoin (DILANTIN) 100 MG ER capsule Take 1 capsule (100 mg total) by mouth 3 (three) times daily.  Qty: 30 capsule, Refills: 0      topiramate (TOPAMAX) 200 MG Tab Take by mouth 2 (two) times daily.      amitriptyline (ELAVIL) 10 MG tablet Take 1 tablet (10 mg total) by mouth nightly as needed for Insomnia.  Qty: 10 tablet, Refills: 0      bisacodyL (DULCOLAX) 5 mg EC tablet Take 2 tablet by mouth at 12 noon the day prior to surgery  Qty: 2 tablet, Refills: 0      erythromycin base (E-MYCIN) 500 MG tablet take 2 tablet by mouth at 1pm,3pm and 10pm day prior to surgery  Qty: 6 tablet, Refills: 0      lacosamide (VIMPAT) 200 mg Tab tablet Take by mouth every 12 (twelve) hours.      ondansetron (ZOFRAN-ODT) 4 MG TbDL Take 1 tablet (4 mg total) by mouth every 8 (eight) hours as  needed.  Qty: 30 tablet, Refills: 0         STOP taking these medications       chlorhexidine (HIBICLENS) 4 % external liquid Comments:   Reason for Stopping:         HYDROcodone-acetaminophen (NORCO) 7.5-325 mg per tablet Comments:   Reason for Stopping:         Listerine Liqd Comments:   Reason for Stopping:         magnesium citrate solution Comments:   Reason for Stopping:         neomycin (MYCIFRADIN) 500 mg Tab Comments:   Reason for Stopping:         prochlorperazine (COMPAZINE) 5 MG tablet Comments:   Reason for Stopping:         promethazine (PHENERGAN) 25 MG suppository Comments:   Reason for Stopping:             Funmi Rocha MD   [employer] General Surgery PGY2

## 2020-06-05 NOTE — PLAN OF CARE
Patient resting in bed, AAOx4. Medications administered as ordered. Patient complained of pain early in the night, resolved with prn medication. CAN drain in place with minimal serosanguinous drainage. Encouraged to call with needs or concerns. Will continue to monitor.

## 2020-06-05 NOTE — PLAN OF CARE
VN reviewed discharge instructions with pt.  AVS printed and handed to pt by bedside nurse.  Reviewed follow-up appointments, medications, diet, and importance of medication compliance.  Reviewed home care instructions, treatment plan, self-management, and when to seek medical attention.  Allowed time for questions.  All questions answered.  Patient verbalized complete understanding of discharge instructions and voices no concerns.     Discharge instructions complete.  Transport/wheelchair requested.  Bedside nurse notified.

## 2020-06-05 NOTE — TELEPHONE ENCOUNTER
----- Message from Kim Weller sent at 6/5/2020 11:01 AM CDT -----  Contact: case mgmt [place]  Patient needs to be seen sooner than the next available appointment for a hospital f/u - within 2 weeks from today. Please call patient and advise.

## 2020-06-05 NOTE — PROGRESS NOTES
"[facility]  Adult Nutrition  Progress Note    SUMMARY       Recommendations    Recommendation:   1. Encourage intake at meals as tolerated.   2. Monitor need for supplements.   3. 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 Pt reports toelrating diet without N/V. NG removed yesterday. Pt with ZES s/p right salpingo oophorectomt, appendectomy, pancreatic and dudenal tumor resection on 5/29. NFPE completed 6/3-nourished.  Nutrition Discharge Planning: d/c to be determined    Nutrition Risk Screen  Nutrition Risk Screen: no indicators present    Nutrition/Diet History  Food Preferences: no cultural or spiritual preferences identified  Spiritual, Cultural Beliefs, Christian Practices, Values that Affect Care: no  Food Allergies: NKFA  Factors Affecting Nutritional Intake: altered gastrointestinal function    Anthropometrics  Temp: 98 °F (36.7 °C)  Height Method: Stated  Height: 5' 5" (165.1 cm)  Height (inches): 65 in  Weight Method: Bed Scale  Weight: 100.6 kg (221 lb 12.5 oz)  Weight (lb): 221.78 lb  Ideal Body Weight (IBW), Female: 125 lb  % Ideal Body Weight, Female (lb): 179.72 %  BMI (Calculated): 36.9  BMI Grade: 35 - 39.9 - obesity - grade II     Lab/Procedures/Meds  Pertinent Labs Reviewed: reviewed  Pertinent Labs Comments: Ca 8.6L, Alb 3.4L  Pertinent Medications Reviewed: reviewed  Pertinent Medications Comments: Vit C, Vit E, zinc, dilantin    Estimated/Assessed Needs  Weight Used For Calorie Calculations: " 100.6 kg (221 lb 12.5 oz)  Energy Calorie Requirements (kcal): 2057  Energy Need Method: [place]-St Jeor(x 1.25)  Protein Requirements: 80-100g (0.8-1.0g/kg)  Weight Used For Protein Calculations: 100.6 kg (221 lb 12.5 oz)  Estimated Fluid Requirement Method: RDA Method  RDA Method (mL): 2057     Nutrition Prescription Ordered  Current Diet Order: Full Liquid    Evaluation of Received Nutrient/Fluid Intake  Total Calories (kcal): 408(IVFE)  % Kcal Needs: 21  IV Fluid (mL): 2400(D5 with NaCl and KCl)  I/O: 350/40  Energy Calories Required: not meeting needs  Protein Required: not meeting needs  Fluid Required: not meeting needs  Comments: LBM 6/4  Tolerance: tolerating  % Intake of Estimated Energy Needs: 25 - 50 %  % Meal Intake: 25 - 50 %    Nutrition Risk  Level of Risk/Frequency of Follow-up: (2 x/week)     Assessment and Plan  Carcinoid tumor of stomach  Contributing Nutrition Diagnosis  Inadequate oral intake    Related to (etiology):   S/p surgical interventions    Signs and Symptoms (as evidenced by):   NPO s/p right salpingo oophorectomy, appendectomy, pancreatic and duodenal tumor resection on 5/29     Interventions;   Collaboration with other provider    Nutrition Diagnosis Status:   Improving (on [place] diet)      Monitor and Evaluation  Food and Nutrient Intake: energy intake, food and beverage intake, parenteral nutrition intake  Food and Nutrient Adminstration: diet order, enteral and parenteral nutrition administration  Knowledge/Beliefs/Attitudes: food and nutrition knowledge/skill  Physical Activity and Function: nutrition-related ADLs and IADLs  Anthropometric Measurements: weight change, weight, body mass index  Biochemical Data, Medical Tests and Procedures: electrolyte and renal panel, gastrointestinal profile, glucose/endocrine profile, inflammatory profile, lipid profile  Nutrition-Focused Physical Findings: overall appearance     Malnutrition Assessment  Orbital Region (Subcutaneous Fat Loss):  well nourished  Upper Arm Region (Subcutaneous Fat Loss): well nourished  Thoracic and Lumbar Region: well nourished   [place] Region (Muscle Loss): well nourished  Clavicle Bone Region (Muscle Loss): well nourished  Clavicle and Acromion Bone Region (Muscle Loss): well nourished  Scapular Bone Region (Muscle Loss): well nourished  Dorsal Hand (Muscle Loss): well nourished   Subcutaneous Fat Loss (Final Summary): well nourished  Muscle Loss Evaluation (Final Summary): well nourished       Nutrition Follow-Up  RD Follow-up?: Yes

## 2020-06-05 NOTE — PT/OT/SLP PROGRESS
Physical Therapy Treatment    Patient Name:  Kristan Goncalves   MRN:  [id number]    Recommendations:     Discharge Recommendations:  home   Discharge Equipment Recommendations: walker, rolling   Barriers to discharge: Inaccessible home(N/A)  Decreased strength    Assessment:     Kristan Goncalves is a 44 y.o. female admitted with a medical diagnosis of Duodenal carcinoid syndrome.  She presents with the following impairments/functional limitations:  weakness, impaired functional mobilty, impaired balance, impaired skin, decreased ROM,pt with improving mobility and most goals met,possible discharge home today..    Rehab Prognosis: Good; patient would benefit from acute skilled PT services to address these deficits and reach maximum level of function.    Recent Surgery: Procedure(s) (LRB):  DUODENECTOMY ROBOTIC (N/A)  OOPHORECTOMY (Right)  ROBOTIC APPENDECTOMY (N/A)  REMOVAL cyst (Right)  LAPAROTOMY, EXPLORATORY, DUODENAL TUMOR RESECTION, PANCREATIC TUMOR RESECTION, LIVER ULTRASOUND 7 Days Post-Op    Plan:     During this hospitalization, patient to be seen 6 x/week to address the identified rehab impairments via gait training, therapeutic activities, therapeutic exercises and progress toward the following goals:    · Plan of Care Expires:  06/30/20    Subjective     Chief Complaint: n/a  Patient/Family Comments/goals: pt has been walking in room.  Pain/Comfort:  · Pain Rating 1: (no c/o's)      Objective:     Communicated with nsg prior to session.  Patient found standing in room with nsg present with   upon PT entry to room.     General Precautions: Standard, fall   Orthopedic Precautions:N/A   Braces: N/A     Functional Mobility:  · Gait: amb ~225' and ~125' w/o AD and S  · Balance: fair standing balance      AM-PAC 6 CLICK MOBILITY  Turning over in bed (including adjusting bedclothes, sheets and blankets)?: 4  Sitting down on and standing up from a chair with arms (e.g., wheelchair, bedside commode,  etc.): 4  Moving from lying on back to sitting on the side of the bed?: 4  Moving to and from a bed to a chair (including a wheelchair)?: 4  Need to walk in hospital room?: 4  Climbing 3-5 steps with a railing?: 3  Basic Mobility Total Score: 23       Therapeutic Activities and Exercises: standing ex's X 10-12 reps inc: toe raises,mini squats and hip flexion with B ue support and SBA.       Patient left up in chair with all lines intact, call button in reach and nsg notified..    GOALS:   Multidisciplinary Problems     Physical Therapy Goals        Problem: Physical Therapy Goal    Goal Priority Disciplines Outcome Goal Variances Interventions   Physical Therapy Goal     PT, PT/OT Ongoing, Progressing     Description:  Goals to be met by: 20     Patient will increase functional independence with mobility by performin. Supine <> sit with Stand-by Assistance  MET 20  2. Sit to stand transfer with Supervision  MET 20  3. Bed to chair transfer with Supervision  MET 20  4. Gait  x 150 feet with Supervision  MET 20  5. Lower extremity exercise program x 10 reps per handout, with supervision                        Time Tracking:     PT Received On: 20  PT Start Time: 907     PT Stop Time: 934  PT Total Time (min): 27 min     Billable Minutes: Gait Training 17 and Therapeutic Exercise 10    Treatment Type: Treatment  PT/PTA: PTA     PTA Visit Number: 2     Enrique Handy, PTA  2020

## 2020-06-09 NOTE — PHYSICIAN QUERY
"PT Name: Kristan Goncalves  MR #: [id number]    Pathology Findings Clarification     CDS: Brandy Capley, RN  Email: [email or web address]    This form is a permanent document in the medical record.     Query Date: June 9, 2020    By submitting this query, we are merely seeking further clarification of documentation.  Please utilize your independent clinical judgment when addressing the question(s) below.    The medical record contains the following:  Pathology Findings Location in Medical Record   FINAL PATHOLOGIC DIAGNOSIS  1. Right ovary, submitted as "right ovary and two cysts", oophorectomy:  Ovary:  Serous borderline tumor, measuring 5 cm in greatest dimension.  Incidental corpus luteal cyst with hemorrhage.    Note:  - The neoplasm identified in the right ovarian cyst as serous papillary is morphologically different and a  separate entity than the well-differentiated neuroendocrine tumors seen in the duodenum and lymph nodes  from the additional specimens in this patient's surgical case   Surgical pathology 5/28       Please clarify:  [ xxx ] Pathology findings noted above are ruled in/confirmed as diagnoses     [  ] Pathology findings noted above are not confirmed as diagnoses     [  ] Other diagnosis (please specify): ___________     [  ] Clinically Undetermined         Please document in your progress notes daily for the duration of treatment until resolved and include in your discharge summary.            "

## 2020-06-16 NOTE — PROGRESS NOTES
" [facility]:  [facility]  A collaboration between [facility] and [facility]        Chief Complaint:  post op follow up    S/p:   5/29/2020 - Duodenectomy Robotic, Oophorectomy - Right, Robotic Appendectomy, REMOVAL cyst - Right, and Laparotomy, Exploratory, Duodenal Tumor Resection, Pancreatic Tumor Resection, Liver Ultrasound     Pathology:   1. Right ovary, submitted as "right ovary and two cysts", oophorectomy:  Ovary:  Serous borderline tumor, measuring 5 cm in greatest dimension.  Incidental corpus luteal cyst with hemorrhage.  Fallopian tube:  Benign fallopian tube with adjacent benign paratubal cysts.  See synoptic report #1 in comment section for further details.  2. Appendix, appendectomy:  Benign appendix with fibrous obliteration of the tip.  No evidence of neoplasia.  3. Submitted as " Pancreas tumor", excisional biopsy:  1 lymph node, positive for metastatic well-differentiated neuroendocrine tumor (1/1).  Mitotic count: 0 mitoses identified 10 high-power fields.  No necrosis is identified.  No pancreatic tissue is present within the entirely submitted specimen. Correlate clinically.  Ancillary stains:  Chromogranin Staining:  Intensity: 3+ Positive cells: 100 (%)  Synaptophysin Staining:  Intensity: 3+ Positive cells: 100 (%)  CD31: 11 vessels per 1 HPF  Ki67: 2% tumor cells staining  Immunostains performed with appropriate positive and negative controls.  4. Lymph node, excisional biopsy:  1 lymph node, negative for metastatic tumor (0/1).  5. Submitted as "pancreatic tumor ", excisional biopsy:  Fibroadipose tissue with 1 lymph node, positive for metastatic well-differentiated  neuroendocrine tumor (1/1).  Mitotic count: 0 mitoses identified in 10 high power fields.  No necrosis is identified.  No pancreatic tissue is present within the entirely submitted specimen. Correlate clinically.  6. Submitted as " ""duodenal tumor", excisional biopsy:  Duodenum, positive for well-differentiated neuroendocrine tumor, grade 1.  Tumor measures 9 mm (0.9 cm) in greatest dimension.  Mitotic count: 0 mitoses identified and 10 high-power fields.  No necrosis is identified.  See synoptic report #2 in comment section for further details.  Ancillary stains:  Chromogranin Staining:  Intensity: 3 blocks Positive cells: 100 (%)  Synaptophysin Staining:  Intensity: 3+ Positive cells: 100 (%)  CD31: 18 vessels per 1 HPF  Ki67: 2% tumor cells staining  Immunostains performed with appropriate positive and negative controls.  Note:  - The neoplasm identified in the right ovarian cyst as serous papillary is morphologically different and a  separate entity than the well-differentiated neuroendocrine tumors seen in the duodenum and lymph nodes  from the additional specimens in this patient's surgical case.      Vital Sign:   Vitals:    06/18/20 1057   BP: 115/76   Pulse: 73   Temp: 99.1 °F (37.3 °C)   TempSrc: Oral   Weight: 99.8 kg (219 lb 15.4 oz)   Height: 5' 5" (1.651 m)         Incision: healing well    Appetite: increased    Restrictions: NO Restrictions    Return to clinic: 3 months        Doing well  Off protonix  Path report reviewed    F/u 3 months            "

## 2020-06-18 ENCOUNTER — OFFICE VISIT (OUTPATIENT)
Dept: NEUROLOGY | Facility: HOSPITAL | Age: 45
End: 2020-06-18
Attending: SURGERY
Payer: MEDICARE

## 2020-06-18 VITALS
BODY MASS INDEX: 36.64 KG/M2 | DIASTOLIC BLOOD PRESSURE: 76 MMHG | TEMPERATURE: 99 F | SYSTOLIC BLOOD PRESSURE: 115 MMHG | HEIGHT: 65 IN | HEART RATE: 73 BPM | WEIGHT: 219.94 LBS

## 2020-06-18 DIAGNOSIS — C7A.092 MALIGNANT CARCINOID TUMOR OF STOMACH: ICD-10-CM

## 2020-06-18 DIAGNOSIS — Z09 POSTOP CHECK: Primary | ICD-10-CM

## 2020-06-18 PROCEDURE — 99214 OFFICE O/P EST MOD 30 MIN: CPT | Performed by: SURGERY

## 2020-06-18 NOTE — PATIENT INSTRUCTIONS
Follow up appt scheduled on Thursday, September 17, 2020 at 2pm.    Gallium scheduled on Thursday, September 17, 2020 at 8am.    MRI scheduled on Thursday, September 17, 2020 at 1130am.      Tumor Markers due August 2020 prior to appts.

## 2020-09-22 NOTE — PROGRESS NOTES
"NOLANETS:  [facility]  A collaboration between [facility] and [facility]      PATIENT: Kristan Goncalves  MRN: [id number]  DATE: 9/28/2020    Subjective:      Chief Complaint:   3 month f/u  She is status post duodenal tumor resection with partial pancreatectomy.  Doing well has no complaints at all eating well no nausea having normal GI system      Vitals: Blood pressure 125/78, pulse 77, temperature 97.9 °F (36.6 °C), temperature source Oral, height 5' 5" (1.651 m), weight 108.1 kg (238 lb 5.1 oz).     ECOG Score: 0 - Asymptomatic    Diagnosis:   1. Malignant carcinoid tumor of stomach    2. Duodenal carcinoid syndrome    3. Zollinger-Lambert syndrome         Interval History:    .Patient presented with recurrent abdominal pain with nausea and vomiting for more than a year.  Patient had numerous ER visits.  She has intermittent diarrhea and a 15# weight loss. She has been on questran and bentyl. No pathology yet.  2/3/20  Consult for elevated gastrin and abnormal octreoscan.Referred by Dr. Zafar Hancock.  Having lot of reflux, upper abdominal pain constant associated with nausea, vomiting and diarrhea  She has been having severe upper abdominal pain for the last 1 year, bile reflux with bile in the back of the throat in the early morning.  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 She may have a type 1 gastric carcinoids.     She has been recently placed on Protonix and Carafate after which she feels she pain is slightly better.  The octreotide scan was reviewed if it has a normal activity at the head of the pancreas.     Will obtain gallium scan to evaluate for and delineate the findings better.  I discussed with him about the need to continue medical management with Protonix Carafate " "and if she is not getting better she may require an antrectomy and gastrojejunostomy.     I then briefly talked about the surgery surgical risks and the this the long-term effects after the surgery such as bleeding infection DVT PE weight loss after the surgery.    Oncologic History:   Oncologic History 5/2020 NET lesion duoden    Oncologic Treatment 3/2020 gastrectomy, 5/2020 duodenectomy   Pathology 5/2020 1. Right ovary, submitted as "right ovary and two cysts", oophorectomy:  Ovary:  Serous borderline tumor, measuring 5 cm in greatest dimension.  Incidental corpus luteal cyst with hemorrhage.  Fallopian tube:  Benign fallopian tube with adjacent benign paratubal cysts.  See synoptic report #1 in comment section for further details.  2. Appendix, appendectomy:  Benign appendix with fibrous obliteration of the tip.  No evidence of neoplasia.  3. Submitted as " Pancreas tumor", excisional biopsy:  1 lymph node, positive for metastatic well-differentiated neuroendocrine tumor (1/1).  Mitotic count: 0 mitoses identified 10 high-power fields.  No necrosis is identified.  No pancreatic tissue is present within the entirely submitted specimen. Correlate clinically.  Ancillary stains:  Chromogranin Staining:  Intensity: 3+ Positive cells: 100 (%)  Synaptophysin Staining:  Intensity: 3+ Positive cells: 100 (%)  CD31: 11 vessels per 1 HPF  Ki67: 2% tumor cells staining  Immunostains performed with appropriate positive and negative controls.  4. Lymph node, excisional biopsy:  1 lymph node, negative for metastatic tumor (0/1).  5. Submitted as "pancreatic tumor ", excisional biopsy:  Fibroadipose tissue with 1 lymph node, positive for metastatic well-differentiated  neuroendocrine tumor (1/1).  Mitotic count: 0 mitoses identified in 10 high power fields.  No necrosis is identified.  No pancreatic tissue is present within the entirely submitted specimen. Correlate clinically.  6. Submitted as "duodenal tumor", excisional " biopsy:  Duodenum, positive for well-differentiated neuroendocrine tumor, grade 1.  Tumor measures 9 mm (0.9 cm) in greatest dimension.  Mitotic count: 0 mitoses identified and 10 high-power fields.  No necrosis is identified.  See synoptic report #2 in comment section for further details.  Ancillary stains:  Chromogranin Staining:  Intensity: 3 blocks Positive cells: 100 (%)  Synaptophysin Staining:  Intensity: 3+ Positive cells: 100 (%)  CD31: 18 vessels per 1 HPF  Ki67: 2% tumor cells staining     Past Medical History:  Past Medical History:   Diagnosis Date    Elevated gastrin level     Seizures     Sleep apnea     Zollinger-Lambert syndrome        Past Surgical History:  Past Surgical History:   Procedure Laterality Date    CHOLECYSTECTOMY      DUODENECTOMY N/A 5/29/2020    Procedure: DUODENECTOMY ROBOTIC;  Surgeon: Ericka Zaragoza MD;  Location: [facility];  Service: General;  Laterality: N/A;    ENDOSCOPIC ULTRASOUND OF UPPER GASTROINTESTINAL TRACT N/A 5/25/2020    Procedure: ULTRASOUND-ENDOSCOPIC-UPPER;  Surgeon: Marcelo Menjivar MD;  Location: [facility];  Service: Endoscopy;  Laterality: N/A;  Needs Rapid COVID    ESOPHAGOGASTRODUODENOSCOPY N/A 5/25/2020    Procedure: EGD (ESOPHAGOGASTRODUODENOSCOPY);  Surgeon: Marcelo Menjivar MD;  Location: [facility];  Service: Endoscopy;  Laterality: N/A;    HYSTERECTOMY      OOPHORECTOMY Right 5/29/2020    Procedure: OOPHORECTOMY;  Surgeon: Ericka Zaragoza MD;  Location: [facility];  Service: General;  Laterality: Right;    ROBOT-ASSISTED APPENDECTOMY N/A 5/29/2020    Procedure: ROBOTIC APPENDECTOMY;  Surgeon: Ericka Zaragoza MD;  Location: [facility];  Service: General;  Laterality: N/A;       Family History:  History reviewed. No pertinent family history.    Allergies:  Patient has no known allergies.    Medications:   Current Outpatient Medications   Medication Sig    amitriptyline (ELAVIL) 10 MG tablet Take 1 tablet (10 mg total) by mouth  nightly as needed for Insomnia.    lacosamide (VIMPAT) 200 mg Tab tablet Take by mouth every 12 (twelve) hours.    ondansetron (ZOFRAN-ODT) 4 MG TbDL Take 1 tablet (4 mg total) by mouth every 8 (eight) hours as needed.    pantoprazole (PROTONIX) 40 MG tablet Take 1 tablet (40 mg total) by mouth once daily.    topiramate (TOPAMAX) 200 MG Tab Take by mouth 2 (two) times daily.    bisacodyL (DULCOLAX) 5 mg EC tablet Take 2 tablet by mouth at 12 noon the day prior to surgery (Patient not taking: Reported on 9/28/2020)    erythromycin base (E-MYCIN) 500 MG tablet take 2 tablet by mouth at 1pm,3pm and 10pm day prior to surgery (Patient not taking: Reported on 9/28/2020)    HYDROcodone-acetaminophen (NORCO) 7.5-325 mg per tablet Take 1 tablet twice a day as needed for pain. To be filled on or after 9/9/20. Do not drive while taking this medication.    phenytoin (DILANTIN) 100 MG ER capsule Take 1 capsule (100 mg total) by mouth 3 (three) times daily. (Patient not taking: Reported on 9/28/2020)    senna-docusate 8.6-50 mg (PERICOLACE) 8.6-50 mg per tablet Take 1 tablet by mouth 2 (two) times daily. While on pain medication (Patient not taking: Reported on 9/28/2020)    traMADoL (ULTRAM) 50 mg tablet Take 1 tablet (50 mg total) by mouth every 6 (six) hours as needed for Pain. (Patient not taking: Reported on 9/28/2020)     No current facility-administered medications for this visit.         Review of Systems   Constitutional: Negative for activity change, appetite change, chills, diaphoresis, fatigue, fever and unexpected weight change.   HENT: Negative for dental problem, drooling, ear discharge, ear pain, facial swelling, hearing loss, mouth sores, nosebleeds, postnasal drip, sinus pain, sneezing, trouble swallowing and voice change.    Eyes: Negative for pain, discharge, itching and visual disturbance.   Respiratory: Negative for apnea, cough, choking, chest tightness, shortness of breath and wheezing.     Cardiovascular: Negative for chest pain, palpitations and leg swelling.   Gastrointestinal: Negative for abdominal distention, anal bleeding, blood in stool, constipation, diarrhea and nausea.   Endocrine: Negative.    Genitourinary: Negative.    Musculoskeletal: Negative.    Skin: Negative for pallor, rash and wound.   Allergic/Immunologic: Negative.    Neurological: Negative for tremors, seizures, facial asymmetry, speech difficulty, weakness, light-headedness, numbness and headaches.   Hematological: Negative for adenopathy. Does not bruise/bleed easily.   Psychiatric/Behavioral: Negative for behavioral problems and confusion.      Objective:      Physical Exam  Constitutional:       Appearance: Normal appearance.   HENT:      Head: Normocephalic.      Right Ear: External ear normal.      Left Ear: External ear normal.      Nose: Nose normal.   Eyes:      Pupils: Pupils are equal, round, and reactive to light.   Neck:      Musculoskeletal: No neck rigidity or muscular tenderness.   Cardiovascular:      Rate and Rhythm: Normal rate and regular rhythm.   Pulmonary:      Effort: Pulmonary effort is normal.   Abdominal:      General: Abdomen is flat. Bowel sounds are normal.      Tenderness: There is no right CVA tenderness, left CVA tenderness or rebound.      Hernia: No hernia is present.      Comments: Incision looks good   Musculoskeletal: Normal range of motion.   Lymphadenopathy:      Cervical: No cervical adenopathy.   Skin:     General: Skin is warm.   Neurological:      General: No focal deficit present.      Mental Status: She is alert and oriented to person, place, and time.   Psychiatric:         Mood and Affect: Mood normal.        Assessment:       1. Malignant carcinoid tumor of stomach    2. Duodenal carcinoid syndrome    3. Zollinger-Lambert syndrome        Laboratory Data:       Scans:   FL Upper GI  Narrative: EXAMINATION:  FL UPPER GI    CLINICAL HISTORY:  eval duodenal  anastamosis;    TECHNIQUE:  Contrast material: Dilute Gastrografin    Fluoroscopy time: 4 minutes    COMPARISON:  05/25/2020    FINDINGS:  Preliminary radiograph: Enteric tube terminates in the left upper quadrant, likely within the jejunum.    Esophagus: Normal caliber.  Dysmotility noted.    Gastroesophageal reflux: Present.    Stomach: Normal.    Duodenum: Status post duodenal resection.  There is slow transit of contrast through the duodenum to the jejunum with limited distention.  No evidence for leak or obstruction given study limitations.    Other findings: None.  Impression: Slow transit of contrast through the duodenum to the jejunum with limited distension, likely secondary to postoperative edema.  No evidence for leak or obstruction given study limitations.  Consider repeat examination or CT for confirmation.    Electronically signed by: Eric Evans MD  Date:    06/03/2020  Time:    14:31       Impression:  A 45-year-old female with a primary duodenal carcinoid tumor with 2 lesions in the head of the pancreas with the elevated gastrin, likely Zollinger-Lambert syndrome underwent resection of these lesions she feels good.  She has no symptoms at all at this point.    She underwent repeat gallium scan and MRI which appears okay to me however will await the radiologist's the input.    She also has enlarged left lobe of the thyroid which recurs FNA will address that since she has recovered from the surgery.  Will arrange for the neuroendocrine markers today.  She needs revisit on the markers in 6 months time an MRI in 6 months time.    She also had a left ovarian cyst which was resected and it showed a borderline in a tumor on ice had recommended her to see the gynecologist in her hometown and she is seen and the recommendation is to follow-up.    Plan:       FNA of the thyroid on the left side  Six months follow-up with MRI and neuroendocrine markers            MAEGAN Barnett MD, FACS   Associate  Professor of Surgery, [facility]   Neuroendocrine Surgery, Hepatic/Pancreatic & General Surgery   [address], [address]   [place] [address]   ph. [phone]; [phone]   fax. [phone]

## 2020-09-28 ENCOUNTER — HOSPITAL ENCOUNTER (OUTPATIENT)
Dept: RADIOLOGY | Facility: HOSPITAL | Age: 45
Discharge: HOME | End: 2020-09-28
Attending: SURGERY
Payer: MEDICARE

## 2020-09-28 ENCOUNTER — TELEPHONE (OUTPATIENT)
Dept: NEUROLOGY | Facility: HOSPITAL | Age: 45
End: 2020-09-28

## 2020-09-28 ENCOUNTER — OFFICE VISIT (OUTPATIENT)
Dept: NEUROLOGY | Facility: HOSPITAL | Age: 45
End: 2020-09-28
Attending: SURGERY
Payer: MEDICARE

## 2020-09-28 VITALS
SYSTOLIC BLOOD PRESSURE: 125 MMHG | BODY MASS INDEX: 39.71 KG/M2 | HEIGHT: 65 IN | WEIGHT: 238.31 LBS | TEMPERATURE: 98 F | HEART RATE: 77 BPM | DIASTOLIC BLOOD PRESSURE: 78 MMHG

## 2020-09-28 DIAGNOSIS — Z09 POSTOP CHECK: ICD-10-CM

## 2020-09-28 DIAGNOSIS — E34.0 DUODENAL CARCINOID SYNDROME: ICD-10-CM

## 2020-09-28 DIAGNOSIS — C7A.092 MALIGNANT CARCINOID TUMOR OF STOMACH: ICD-10-CM

## 2020-09-28 DIAGNOSIS — C7A.092 MALIGNANT CARCINOID TUMOR OF STOMACH: Primary | ICD-10-CM

## 2020-09-28 DIAGNOSIS — E16.4 ZOLLINGER-ELLISON SYNDROME: ICD-10-CM

## 2020-09-28 DIAGNOSIS — R93.89 ABNORMAL FINDINGS ON IMAGING TEST: Primary | ICD-10-CM

## 2020-09-28 PROCEDURE — A9585 GADOBUTROL INJECTION: HCPCS | Performed by: SURGERY

## 2020-09-28 PROCEDURE — 72197 MRI ABDOMEN-PELVIS W W/O CONTRAST (XPD): ICD-10-PCS | Mod: 26,,, | Performed by: RADIOLOGY

## 2020-09-28 PROCEDURE — 78815 NM PET 68GA DOTATATE WHOLE BODY: ICD-10-PCS | Mod: 26,PS,, | Performed by: RADIOLOGY

## 2020-09-28 PROCEDURE — 78815 PET IMAGE W/CT SKULL-THIGH: CPT | Mod: TC

## 2020-09-28 PROCEDURE — 72197 MRI PELVIS W/O & W/DYE: CPT | Mod: TC

## 2020-09-28 PROCEDURE — A9587 GALLIUM GA-68: HCPCS

## 2020-09-28 PROCEDURE — 74183 MRI ABDOMEN-PELVIS W W/O CONTRAST (XPD): ICD-10-PCS | Mod: 26,,, | Performed by: RADIOLOGY

## 2020-09-28 PROCEDURE — 74183 MRI ABD W/O CNTR FLWD CNTR: CPT | Mod: 26,,, | Performed by: RADIOLOGY

## 2020-09-28 PROCEDURE — 99214 OFFICE O/P EST MOD 30 MIN: CPT | Mod: 25 | Performed by: SURGERY

## 2020-09-28 PROCEDURE — 25500020 PHARM REV CODE 255: Performed by: SURGERY

## 2020-09-28 PROCEDURE — 78815 PET IMAGE W/CT SKULL-THIGH: CPT | Mod: 26,PS,, | Performed by: RADIOLOGY

## 2020-09-28 PROCEDURE — 72197 MRI PELVIS W/O & W/DYE: CPT | Mod: 26,,, | Performed by: RADIOLOGY

## 2020-09-28 RX ORDER — GADOBUTROL 604.72 MG/ML
10 INJECTION INTRAVENOUS
Status: COMPLETED | OUTPATIENT
Start: 2020-09-28 | End: 2020-09-28

## 2020-09-28 RX ORDER — HYDROCODONE BITARTRATE AND ACETAMINOPHEN 7.5; 325 MG/1; MG/1
TABLET ORAL
COMMUNITY
Start: 2020-09-10

## 2020-09-28 RX ADMIN — GADOBUTROL 10 ML: 604.72 INJECTION INTRAVENOUS at 03:09

## 2020-09-28 NOTE — PATIENT INSTRUCTIONS
Labs today 1st floor       Follow up appt with Dr. Lepe MRI, Labs and EUS in 6 months    The staff for the EUS will call and set up a day and time

## 2020-10-02 ENCOUNTER — TELEPHONE (OUTPATIENT)
Dept: NEUROLOGY | Facility: HOSPITAL | Age: 45
End: 2020-10-02

## 2020-10-02 NOTE — TELEPHONE ENCOUNTER
Spoke with pt to find out where she wanted to have her FNA of Thyroid done. She gave me her PCP name and number. Dr. Yana Hensley in [place] I added him to her care team and faxed orders and ask that they set up the procedure and if any question call and ask for me.

## 2021-03-09 ENCOUNTER — TELEPHONE (OUTPATIENT)
Dept: NEUROLOGY | Facility: HOSPITAL | Age: 46
End: 2021-03-09

## 2021-03-09 DIAGNOSIS — R93.89 ABNORMAL FINDINGS ON IMAGING TEST: Primary | ICD-10-CM

## 2021-03-10 ENCOUNTER — TELEPHONE (OUTPATIENT)
Dept: NEUROLOGY | Facility: HOSPITAL | Age: 46
End: 2021-03-10

## 2021-03-10 DIAGNOSIS — R93.89 ABNORMAL FINDINGS ON IMAGING TEST: Primary | ICD-10-CM

## 2021-03-26 ENCOUNTER — TELEPHONE (OUTPATIENT)
Dept: INTERVENTIONAL RADIOLOGY/VASCULAR | Facility: HOSPITAL | Age: 46
End: 2021-03-26

## 2021-03-29 ENCOUNTER — HOSPITAL ENCOUNTER (OUTPATIENT)
Dept: INTERVENTIONAL RADIOLOGY/VASCULAR | Facility: HOSPITAL | Age: 46
Discharge: HOME | End: 2021-03-29
Attending: SURGERY
Payer: MEDICARE

## 2021-03-29 ENCOUNTER — OFFICE VISIT (OUTPATIENT)
Dept: NEUROLOGY | Facility: HOSPITAL | Age: 46
End: 2021-03-29
Attending: SURGERY
Payer: MEDICARE

## 2021-03-29 ENCOUNTER — HOSPITAL ENCOUNTER (OUTPATIENT)
Dept: RADIOLOGY | Facility: HOSPITAL | Age: 46
Discharge: HOME | End: 2021-03-29
Attending: SURGERY
Payer: MEDICARE

## 2021-03-29 VITALS
HEIGHT: 65 IN | HEART RATE: 78 BPM | TEMPERATURE: 99 F | SYSTOLIC BLOOD PRESSURE: 123 MMHG | WEIGHT: 250.13 LBS | BODY MASS INDEX: 41.67 KG/M2 | DIASTOLIC BLOOD PRESSURE: 81 MMHG

## 2021-03-29 VITALS
SYSTOLIC BLOOD PRESSURE: 117 MMHG | TEMPERATURE: 97 F | OXYGEN SATURATION: 99 % | DIASTOLIC BLOOD PRESSURE: 67 MMHG | RESPIRATION RATE: 16 BRPM | HEART RATE: 71 BPM

## 2021-03-29 DIAGNOSIS — R93.89 ABNORMAL FINDINGS ON IMAGING TEST: ICD-10-CM

## 2021-03-29 DIAGNOSIS — C7A.092 MALIGNANT CARCINOID TUMOR OF STOMACH: ICD-10-CM

## 2021-03-29 DIAGNOSIS — E16.4: Primary | ICD-10-CM

## 2021-03-29 PROCEDURE — 74183 MRI ABDOMEN W WO CONTRAST: ICD-10-PCS | Mod: 26,,, | Performed by: RADIOLOGY

## 2021-03-29 PROCEDURE — 10005 FNA BX W/US GDN 1ST LES: CPT | Mod: ,,, | Performed by: RADIOLOGY

## 2021-03-29 PROCEDURE — 25000003 PHARM REV CODE 250: Performed by: RADIOLOGY

## 2021-03-29 PROCEDURE — 99214 OFFICE O/P EST MOD 30 MIN: CPT | Mod: 25 | Performed by: SURGERY

## 2021-03-29 PROCEDURE — 76536 US EXAM OF HEAD AND NECK: CPT | Mod: TC

## 2021-03-29 PROCEDURE — 74183 MRI ABD W/O CNTR FLWD CNTR: CPT | Mod: TC

## 2021-03-29 PROCEDURE — 88173 CYTOPATH EVAL FNA REPORT: CPT | Performed by: PATHOLOGY

## 2021-03-29 PROCEDURE — 88173 PR  INTERPRETATION OF FNA SMEAR: ICD-10-PCS | Mod: 26,,, | Performed by: PATHOLOGY

## 2021-03-29 PROCEDURE — 76536 US EXAM OF HEAD AND NECK: CPT | Mod: 26,,, | Performed by: RADIOLOGY

## 2021-03-29 PROCEDURE — 10005 FNA BX W/US GDN 1ST LES: CPT

## 2021-03-29 PROCEDURE — 76536 US THYROID: ICD-10-PCS | Mod: 26,,, | Performed by: RADIOLOGY

## 2021-03-29 PROCEDURE — 88173 CYTOPATH EVAL FNA REPORT: CPT | Mod: 26,,, | Performed by: PATHOLOGY

## 2021-03-29 PROCEDURE — 88172 PR  EVALUATION OF FNA SMEAR TO DETERMINE ADEQUACY, FIRST EVAL: ICD-10-PCS | Mod: 26,,, | Performed by: PATHOLOGY

## 2021-03-29 PROCEDURE — 10005 IR US FINE NEEDLE ASPIRATION BIOPSY, FIRST LESION: ICD-10-PCS | Mod: ,,, | Performed by: RADIOLOGY

## 2021-03-29 PROCEDURE — 88172 CYTP DX EVAL FNA 1ST EA SITE: CPT | Mod: 26,,, | Performed by: PATHOLOGY

## 2021-03-29 PROCEDURE — 88172 CYTP DX EVAL FNA 1ST EA SITE: CPT | Performed by: PATHOLOGY

## 2021-03-29 PROCEDURE — 88177 PR  EVALUATION OF FNA SMEAR TO DETERMINE ADEQUACY, EA ADD EVAL: ICD-10-PCS | Mod: 26,,, | Performed by: PATHOLOGY

## 2021-03-29 PROCEDURE — 88177 CYTP FNA EVAL EA ADDL: CPT | Mod: 26,,, | Performed by: PATHOLOGY

## 2021-03-29 PROCEDURE — A9585 GADOBUTROL INJECTION: HCPCS | Performed by: SURGERY

## 2021-03-29 PROCEDURE — 88177 CYTP FNA EVAL EA ADDL: CPT | Performed by: PATHOLOGY

## 2021-03-29 PROCEDURE — 25500020 PHARM REV CODE 255: Performed by: SURGERY

## 2021-03-29 PROCEDURE — 74183 MRI ABD W/O CNTR FLWD CNTR: CPT | Mod: 26,,, | Performed by: RADIOLOGY

## 2021-03-29 RX ORDER — LIDOCAINE HYDROCHLORIDE 10 MG/ML
INJECTION INFILTRATION; PERINEURAL CODE/TRAUMA/SEDATION MEDICATION
Status: COMPLETED | OUTPATIENT
Start: 2021-03-29 | End: 2021-03-29

## 2021-03-29 RX ORDER — GADOBUTROL 604.72 MG/ML
10 INJECTION INTRAVENOUS
Status: COMPLETED | OUTPATIENT
Start: 2021-03-29 | End: 2021-03-29

## 2021-03-29 RX ADMIN — GADOBUTROL 10 ML: 604.72 INJECTION INTRAVENOUS at 09:03

## 2021-03-29 RX ADMIN — LIDOCAINE HYDROCHLORIDE 5 ML: 10 INJECTION, SOLUTION INFILTRATION; PERINEURAL at 04:03

## 2021-04-01 LAB
ADEQUACY: NORMAL
FINAL PATHOLOGIC DIAGNOSIS: NORMAL
Lab: NORMAL

## 2021-09-15 ENCOUNTER — TELEPHONE (OUTPATIENT)
Dept: NEUROLOGY | Facility: HOSPITAL | Age: 46
End: 2021-09-15

## 2021-09-15 ENCOUNTER — TELEPHONE (OUTPATIENT)
Dept: GASTROENTEROLOGY | Facility: CLINIC | Age: 46
End: 2021-09-15

## 2021-09-15 DIAGNOSIS — E34.0 DUODENAL CARCINOID SYNDROME: Primary | ICD-10-CM

## 2021-09-30 ENCOUNTER — TELEPHONE (OUTPATIENT)
Dept: ENDOSCOPY | Facility: HOSPITAL | Age: 46
End: 2021-09-30

## 2021-10-04 ENCOUNTER — ANESTHESIA (OUTPATIENT)
Dept: ENDOSCOPY | Facility: HOSPITAL | Age: 46
End: 2021-10-04
Payer: MEDICARE

## 2021-10-04 ENCOUNTER — TELEPHONE (OUTPATIENT)
Dept: ENDOSCOPY | Facility: HOSPITAL | Age: 46
End: 2021-10-04

## 2021-10-04 ENCOUNTER — HOSPITAL ENCOUNTER (OUTPATIENT)
Facility: HOSPITAL | Age: 46
Discharge: HOME | End: 2021-10-04
Attending: INTERNAL MEDICINE | Admitting: INTERNAL MEDICINE
Payer: MEDICARE

## 2021-10-04 ENCOUNTER — HOSPITAL ENCOUNTER (OUTPATIENT)
Dept: RADIOLOGY | Facility: HOSPITAL | Age: 46
Discharge: HOME | End: 2021-10-04
Attending: SURGERY
Payer: MEDICARE

## 2021-10-04 ENCOUNTER — ANESTHESIA EVENT (OUTPATIENT)
Dept: ENDOSCOPY | Facility: HOSPITAL | Age: 46
End: 2021-10-04
Payer: MEDICARE

## 2021-10-04 VITALS
BODY MASS INDEX: 39.99 KG/M2 | HEIGHT: 65 IN | TEMPERATURE: 98 F | DIASTOLIC BLOOD PRESSURE: 75 MMHG | SYSTOLIC BLOOD PRESSURE: 110 MMHG | WEIGHT: 240 LBS | HEART RATE: 74 BPM | RESPIRATION RATE: 20 BRPM | OXYGEN SATURATION: 98 %

## 2021-10-04 DIAGNOSIS — E16.4: ICD-10-CM

## 2021-10-04 DIAGNOSIS — D3A.00 CARCINOID (EXCEPT OF APPENDIX): ICD-10-CM

## 2021-10-04 LAB — SARS-COV-2 RDRP RESP QL NAA+PROBE: NEGATIVE

## 2021-10-04 PROCEDURE — A9585 GADOBUTROL INJECTION: HCPCS | Performed by: SURGERY

## 2021-10-04 PROCEDURE — 37000009 HC ANESTHESIA EA ADD 15 MINS: Performed by: INTERNAL MEDICINE

## 2021-10-04 PROCEDURE — 25000003 PHARM REV CODE 250: Performed by: NURSE ANESTHETIST, CERTIFIED REGISTERED

## 2021-10-04 PROCEDURE — 37000008 HC ANESTHESIA 1ST 15 MINUTES: Performed by: INTERNAL MEDICINE

## 2021-10-04 PROCEDURE — 74183 MRI ABDOMEN W WO CONTRAST: ICD-10-PCS | Mod: 26,,, | Performed by: RADIOLOGY

## 2021-10-04 PROCEDURE — 25000003 PHARM REV CODE 250: Performed by: INTERNAL MEDICINE

## 2021-10-04 PROCEDURE — 43259 PR ENDOSCOPIC ULTRASOUND EXAM: ICD-10-PCS | Mod: ,,, | Performed by: INTERNAL MEDICINE

## 2021-10-04 PROCEDURE — 63600175 PHARM REV CODE 636 W HCPCS: Performed by: NURSE ANESTHETIST, CERTIFIED REGISTERED

## 2021-10-04 PROCEDURE — 74183 MRI ABD W/O CNTR FLWD CNTR: CPT | Mod: 26,,, | Performed by: RADIOLOGY

## 2021-10-04 PROCEDURE — 43259 EGD US EXAM DUODENUM/JEJUNUM: CPT | Mod: ,,, | Performed by: INTERNAL MEDICINE

## 2021-10-04 PROCEDURE — 25500020 PHARM REV CODE 255: Performed by: SURGERY

## 2021-10-04 PROCEDURE — 74183 MRI ABD W/O CNTR FLWD CNTR: CPT | Mod: TC

## 2021-10-04 PROCEDURE — U0002 COVID-19 LAB TEST NON-CDC: HCPCS | Performed by: INTERNAL MEDICINE

## 2021-10-04 PROCEDURE — 43259 EGD US EXAM DUODENUM/JEJUNUM: CPT | Performed by: INTERNAL MEDICINE

## 2021-10-04 RX ORDER — SODIUM CHLORIDE 0.9 % (FLUSH) 0.9 %
10 SYRINGE (ML) INJECTION
Status: DISCONTINUED | OUTPATIENT
Start: 2021-10-04 | End: 2021-10-04 | Stop reason: HOSPADM

## 2021-10-04 RX ORDER — LIDOCAINE HYDROCHLORIDE 20 MG/ML
INJECTION, SOLUTION EPIDURAL; INFILTRATION; INTRACAUDAL; PERINEURAL
Status: DISCONTINUED | OUTPATIENT
Start: 2021-10-04 | End: 2021-10-04

## 2021-10-04 RX ORDER — SODIUM CHLORIDE 9 MG/ML
INJECTION, SOLUTION INTRAVENOUS CONTINUOUS
Status: DISCONTINUED | OUTPATIENT
Start: 2021-10-04 | End: 2021-10-04 | Stop reason: HOSPADM

## 2021-10-04 RX ORDER — GADOBUTROL 604.72 MG/ML
10 INJECTION INTRAVENOUS
Status: COMPLETED | OUTPATIENT
Start: 2021-10-04 | End: 2021-10-04

## 2021-10-04 RX ORDER — PROPOFOL 10 MG/ML
VIAL (ML) INTRAVENOUS
Status: DISCONTINUED | OUTPATIENT
Start: 2021-10-04 | End: 2021-10-04

## 2021-10-04 RX ORDER — SODIUM CHLORIDE 9 MG/ML
INJECTION, SOLUTION INTRAVENOUS CONTINUOUS PRN
Status: DISCONTINUED | OUTPATIENT
Start: 2021-10-04 | End: 2021-10-04

## 2021-10-04 RX ORDER — PROPOFOL 10 MG/ML
VIAL (ML) INTRAVENOUS CONTINUOUS PRN
Status: DISCONTINUED | OUTPATIENT
Start: 2021-10-04 | End: 2021-10-04

## 2021-10-04 RX ADMIN — GADOBUTROL 10 ML: 604.72 INJECTION INTRAVENOUS at 01:10

## 2021-10-04 RX ADMIN — PROPOFOL 80 MG: 10 INJECTION, EMULSION INTRAVENOUS at 11:10

## 2021-10-04 RX ADMIN — PROPOFOL 150 MCG/KG/MIN: 10 INJECTION, EMULSION INTRAVENOUS at 11:10

## 2021-10-04 RX ADMIN — LIDOCAINE HYDROCHLORIDE 80 MG: 20 INJECTION, SOLUTION EPIDURAL; INFILTRATION; INTRACAUDAL; PERINEURAL at 11:10

## 2021-10-04 RX ADMIN — SODIUM CHLORIDE: 0.9 INJECTION, SOLUTION INTRAVENOUS at 10:10

## 2021-10-04 RX ADMIN — SODIUM CHLORIDE: 0.9 INJECTION, SOLUTION INTRAVENOUS at 11:10

## (undated) DEVICE — SUT VICRYL 3-0 27 SH

## (undated) DEVICE — Device

## (undated) DEVICE — SUT SILK 0 STRANDS 30IN BLK

## (undated) DEVICE — NDL INSUF ULTRA VERESS 120MM

## (undated) DEVICE — BAG TISSUE RETRIEVAL 5MM

## (undated) DEVICE — KIT GELPORT LAPAROSCOPIC ABD

## (undated) DEVICE — FORCE BIPOLAR

## (undated) DEVICE — CORD BIPOLAR ENERGY INST XI

## (undated) DEVICE — BOOT SUTURE AID

## (undated) DEVICE — CONTAINER SPECIMEN STRL 4OZ

## (undated) DEVICE — CORD MONOPOLAR ENERGY INST XI

## (undated) DEVICE — SYR 10CC LUER LOCK

## (undated) DEVICE — SEALER VESSEL EXTEND

## (undated) DEVICE — SEE MEDLINE ITEM 152532

## (undated) DEVICE — CAUTERY TIP POLISHER

## (undated) DEVICE — ELECTRODE REM PLYHSV RETURN 9

## (undated) DEVICE — TROCAR ENDOPATH XCEL 11MM 10CM

## (undated) DEVICE — DRAPE INCISE IOBAN 2 23X23IN

## (undated) DEVICE — TROCAR ENDOPATH XCEL 5X75MM

## (undated) DEVICE — SOL IRR NACL .9% 3000ML

## (undated) DEVICE — DRAPE ARM DAVINCI XI

## (undated) DEVICE — CLOSURE SKIN STERI STRIP 1/2X4

## (undated) DEVICE — SOL CLEARIFY VISUALIZATION LAP

## (undated) DEVICE — SEE MEDLINE ITEM 157117

## (undated) DEVICE — GLOVE BIOGEL PI MICRO INDIC 8

## (undated) DEVICE — SUT MONOCRYL 4-0 PS-2

## (undated) DEVICE — ADHESIVE DERMABOND ADVANCED

## (undated) DEVICE — BAG INZII TISS RETRV 12/15MM

## (undated) DEVICE — EVACUATOR WOUND BULB 100CC

## (undated) DEVICE — NDL HYPDRM 23X15

## (undated) DEVICE — KIT WING PAD POSITIONING

## (undated) DEVICE — SUT 4/0 27IN PDS II VIO MON

## (undated) DEVICE — SEE MEDLINE ITEM 156952

## (undated) DEVICE — DRAIN CHANNEL ROUND 19FR

## (undated) DEVICE — SCISSOR HOT SHEARS XI

## (undated) DEVICE — KIT ANTIFOG

## (undated) DEVICE — GLOVE SURG BIOGEL LATEX SZ 7.5

## (undated) DEVICE — SPONGE LAP 18X18 PREWASHED

## (undated) DEVICE — FORCEP FEN BIOPOLAR XI

## (undated) DEVICE — SUT 1 36IN PDS II VIO MONO

## (undated) DEVICE — IRRIGATOR ENDOSCOPY DISP.

## (undated) DEVICE — SUT CTD VICRYL 0 UND BR CT

## (undated) DEVICE — MANIFOLD 4 PORT

## (undated) DEVICE — IRRIGATOR ENDOWRIST XI SUCTION

## (undated) DEVICE — SEAL UNIVERSAL 5MM-8MM XI

## (undated) DEVICE — DRESSING AQUACEL SACRAL 9 X 9

## (undated) DEVICE — SUT ETHILON 2-0 FS 18IN BLK